# Patient Record
Sex: FEMALE | Race: WHITE | NOT HISPANIC OR LATINO | Employment: OTHER | URBAN - METROPOLITAN AREA
[De-identification: names, ages, dates, MRNs, and addresses within clinical notes are randomized per-mention and may not be internally consistent; named-entity substitution may affect disease eponyms.]

---

## 2023-10-19 ENCOUNTER — TELEPHONE (OUTPATIENT)
Dept: PRIMARY CARE | Facility: CLINIC | Age: 43
End: 2023-10-19
Payer: MEDICARE

## 2023-10-19 DIAGNOSIS — M19.90 ARTHRITIS: Primary | ICD-10-CM

## 2023-10-19 NOTE — TELEPHONE ENCOUNTER
Ernestina called and stated that it is time to renew Laura gilliland.  She wanted to know if we could mail it to her house please

## 2023-11-11 PROBLEM — J33.9 NASAL POLYP: Status: ACTIVE | Noted: 2023-11-11

## 2023-11-11 PROBLEM — D22.39 MELANOCYTIC NEVI OF OTHER PARTS OF FACE: Status: ACTIVE | Noted: 2023-06-15

## 2023-11-11 PROBLEM — F80.9 MENTAL AND BEHAVIORAL PROBLEMS WITH COMMUNICATION (INCLUDING SPEECH): Status: ACTIVE | Noted: 2023-11-11

## 2023-11-11 PROBLEM — I73.00 RAYNAUD PHENOMENON: Status: ACTIVE | Noted: 2023-11-11

## 2023-11-11 PROBLEM — L21.9 SEBORRHEIC DERMATITIS, UNSPECIFIED: Status: ACTIVE | Noted: 2023-06-15

## 2023-11-11 PROBLEM — J32.9 CHRONIC SINUSITIS, UNSPECIFIED: Status: ACTIVE | Noted: 2023-11-11

## 2023-11-11 PROBLEM — F81.9 LEARNING DISABILITIES: Status: ACTIVE | Noted: 2023-11-11

## 2023-11-11 PROBLEM — F42.9 OBSESSIVE-COMPULSIVE DISORDER: Status: ACTIVE | Noted: 2023-11-11

## 2023-11-11 PROBLEM — Z79.899 HIGH RISK MEDICATION USE: Status: ACTIVE | Noted: 2023-11-11

## 2023-11-11 PROBLEM — L71.9 ROSACEA: Status: ACTIVE | Noted: 2023-06-15

## 2023-11-11 PROBLEM — R60.0 PERIPHERAL EDEMA: Status: ACTIVE | Noted: 2023-11-11

## 2023-11-11 PROBLEM — D22.4 MELANOCYTIC NEVI OF SCALP AND NECK: Status: ACTIVE | Noted: 2023-06-15

## 2023-11-11 PROBLEM — F41.1 GENERALIZED ANXIETY DISORDER: Status: ACTIVE | Noted: 2023-11-11

## 2023-11-11 PROBLEM — H10.13 ALLERGIC CONJUNCTIVITIS OF BOTH EYES: Status: ACTIVE | Noted: 2023-11-11

## 2023-11-11 PROBLEM — D22.70 MELANOCYTIC NEVI OF UNSPECIFIED LOWER LIMB, INCLUDING HIP: Status: ACTIVE | Noted: 2023-06-15

## 2023-11-11 PROBLEM — L57.9 SKIN CHANGES DUE TO CHRONIC EXPOSURE TO NONIONIZING RADIATION, UNSPECIFIED: Status: ACTIVE | Noted: 2023-06-15

## 2023-11-11 PROBLEM — F40.02 AGORAPHOBIA WITHOUT PANIC DISORDER: Status: ACTIVE | Noted: 2023-11-11

## 2023-11-11 PROBLEM — K21.00 REFLUX ESOPHAGITIS: Status: ACTIVE | Noted: 2023-11-11

## 2023-11-11 PROBLEM — F41.9 ANXIETY: Status: ACTIVE | Noted: 2023-11-11

## 2023-11-11 PROBLEM — B37.9 YEAST INFECTION: Status: ACTIVE | Noted: 2023-11-11

## 2023-11-11 PROBLEM — D22.5 MELANOCYTIC NEVI OF TRUNK: Status: ACTIVE | Noted: 2023-06-15

## 2023-11-11 PROBLEM — Z30.9 CONTRACEPTIVE MANAGEMENT: Status: ACTIVE | Noted: 2023-11-11

## 2023-11-11 PROBLEM — F71 MODERATE INTELLECTUAL DISABILITIES: Status: ACTIVE | Noted: 2023-11-11

## 2023-11-11 PROBLEM — D22.60 MELANOCYTIC NEVI OF UNSPECIFIED UPPER LIMB, INCLUDING SHOULDER: Status: ACTIVE | Noted: 2023-06-15

## 2023-11-11 PROBLEM — E03.9 ADULT HYPOTHYROIDISM: Status: ACTIVE | Noted: 2023-11-11

## 2023-11-11 PROBLEM — R60.9 PERIPHERAL EDEMA: Status: ACTIVE | Noted: 2023-11-11

## 2023-11-11 RX ORDER — MUPIROCIN 20 MG/G
OINTMENT TOPICAL
COMMUNITY
Start: 2020-05-20

## 2023-11-11 RX ORDER — FLUVOXAMINE MALEATE 100 MG/1
100 TABLET, COATED ORAL 2 TIMES DAILY
COMMUNITY
End: 2023-11-13 | Stop reason: SDUPTHER

## 2023-11-11 RX ORDER — KETOCONAZOLE 20 MG/G
CREAM TOPICAL
COMMUNITY
Start: 2023-06-15

## 2023-11-11 RX ORDER — HYDROXYZINE HYDROCHLORIDE 10 MG/1
10 TABLET, FILM COATED ORAL 2 TIMES DAILY PRN
COMMUNITY

## 2023-11-11 RX ORDER — HYDROCHLOROTHIAZIDE 12.5 MG/1
1 CAPSULE ORAL DAILY
COMMUNITY
Start: 2019-07-16

## 2023-11-11 RX ORDER — ACETAMINOPHEN 500 MG
TABLET ORAL
COMMUNITY

## 2023-11-11 RX ORDER — KETOTIFEN FUMARATE 0.35 MG/ML
1 SOLUTION/ DROPS OPHTHALMIC
COMMUNITY
Start: 2019-09-13 | End: 2024-05-01 | Stop reason: ALTCHOICE

## 2023-11-11 RX ORDER — NYSTATIN 100000 [USP'U]/G
POWDER TOPICAL NIGHTLY PRN
COMMUNITY
End: 2024-05-01 | Stop reason: SDUPTHER

## 2023-11-11 RX ORDER — OMEPRAZOLE 20 MG/1
20 CAPSULE, DELAYED RELEASE ORAL DAILY
COMMUNITY
End: 2024-01-16

## 2023-11-11 RX ORDER — LEVOTHYROXINE SODIUM 75 UG/1
75 TABLET ORAL DAILY
COMMUNITY
End: 2024-02-06 | Stop reason: SDUPTHER

## 2023-11-11 RX ORDER — FLUVOXAMINE MALEATE 50 MG/1
50 TABLET, FILM COATED ORAL DAILY
COMMUNITY
Start: 2023-06-22 | End: 2023-11-12 | Stop reason: WASHOUT

## 2023-11-11 RX ORDER — KETOCONAZOLE 20 MG/ML
SHAMPOO, SUSPENSION TOPICAL
COMMUNITY

## 2023-11-11 RX ORDER — ACETAMINOPHEN, DIPHENHYDRAMINE HCL, PHENYLEPHRINE HCL 325; 25; 5 MG/1; MG/1; MG/1
10 TABLET ORAL
COMMUNITY
Start: 2018-05-03

## 2023-11-11 RX ORDER — ALPRAZOLAM 0.5 MG/1
0.5 TABLET ORAL 3 TIMES DAILY
COMMUNITY
End: 2023-11-13 | Stop reason: SDUPTHER

## 2023-11-11 RX ORDER — MEDROXYPROGESTERONE ACETATE 150 MG/ML
150 INJECTION, SUSPENSION INTRAMUSCULAR
COMMUNITY
End: 2024-04-12

## 2023-11-12 NOTE — PROGRESS NOTES
Outpatient Psychiatry    A HIPAA-compliant interactive audio and video telecommunication system which permits real time communications between the patient (at the originating site) and provider (at the distant site) was utilized to provide this telehealth service.     The patient, family, caregivers and guardian (as appropriate) have provided consent on this date to conduct treatment via this telehealth service.  The patient's identity and physical location were verified at the time of this visit.      Present for appointment: Sandra and mom/guardian (Ernestina).    SUBJECTIVE    No health problems since last check-in.  O/C symptoms/behaviors are about the same.  Her day tends to revolve around performing her compulsive adjusting/checking/arranging rituals.  She continues to have some repetitive skin picking and hair pulling, which is mostly unchanged.  She is a bit more anxious and on-edge today as the grand-kids are visiting (mom gave Sandra 10mg of hydroxyzine this morning).  Still up a lot at night engaging in rituals.    Review of Systems   Constitutional:  Negative for activity change.   HENT:  Negative for drooling and trouble swallowing.    Respiratory:  Negative for cough and choking.    Cardiovascular:  Negative for chest pain.   Gastrointestinal:  Negative for abdominal pain and constipation.   Genitourinary:  Negative for dysuria and enuresis.   Musculoskeletal:  Negative for arthralgias, gait problem and myalgias.   Neurological:  Negative for dizziness, tremors, seizures, syncope and light-headedness.   Psychiatric/Behavioral:  Positive for sleep disturbance. Negative for behavioral problems and self-injury.       Controlled Substance Evaluation  OARRS/PDMP reviewed: Davon Robertson MD on 11/13/2023  7:28 AM  Is the patient prescribed a combination of a benzodiazepine and opioid? No  I have personally reviewed the OARRS report for Sandra BRENNON Rousseau.   I have considered the risks of abuse, dependence,  addiction and diversion.    I believe that it is clinically appropriate for Sandra Rousseau to be prescribed this medication.    Last Urine Drug Screen:  Recent Results (from the past 8760 hour(s))   OPIATE/OPIOID/BENZO PRESCRIPTION COMPLIANCE    Collection Time: 11/21/22  7:32 AM   Result Value Ref Range    DRUG SCREEN COMMENT URINE SEE BELOW     Creatine, Urine 108.2 mg/dL    Amphetamine Screen, Urine PRESUMPTIVE NEGATIVE NEGATIVE    Barbiturate Screen, Urine PRESUMPTIVE NEGATIVE NEGATIVE    Cannabinoid Screen, Urine PRESUMPTIVE NEGATIVE NEGATIVE    Cocaine Screen, Urine PRESUMPTIVE NEGATIVE NEGATIVE    PCP Screen, Urine PRESUMPTIVE NEGATIVE NEGATIVE    7-Aminoclonazepam <25 Cutoff <25 ng/mL    Alpha-Hydroxyalprazolam 250 (A) Cutoff <25 ng/mL    Alpha-Hydroxymidazolam <25 Cutoff <25 ng/mL    Alprazolam 427 (A) Cutoff <25 ng/mL    Chlordiazepoxide <25 Cutoff <25 ng/mL    Clonazepam <25 Cutoff <25 ng/mL    Diazepam <25 Cutoff <25 ng/mL    Lorazepam <25 Cutoff <25 ng/mL    Midazolam <25 Cutoff <25 ng/mL    Nordiazepam <25 Cutoff <25 ng/mL    Oxazepam <25 Cutoff <25 ng/mL    Temazepam <25 Cutoff <25 ng/mL    Zolpidem <25 Cutoff <25 ng/mL    Zolpidem Metabolite (ZCA) <25 Cutoff <25 ng/mL    6-Acetylmorphine <25 Cutoff <25 ng/mL    Codeine <50 Cutoff <50 ng/mL    Hydrocodone <25 Cutoff <25 ng/mL    Hydromorphone <25 Cutoff <25 ng/mL    Morphine Urine <50 Cutoff <50 ng/mL    Norhydrocodone <25 Cutoff <25 ng/mL    Noroxycodone <25 Cutoff <25 ng/mL    Oxycodone <25 Cutoff <25 ng/mL    Oxymorphone <25 Cutoff <25 ng/mL    Tramadol <50 Cutoff <50 ng/mL    O-Desmethyltramadol <50 Cutoff <50 ng/mL    Fentanyl <2.5 Cutoff<2.5 ng/mL    Norfentanyl <2.5 Cutoff<2.5 ng/mL    METHADONE CONFIRMATION,URINE <25 Cutoff <25 ng/mL    EDDP <25 Cutoff <25 ng/mL     Results as expected? Yes    Controlled Substance Agreement: 11/21/2022  Reviewed Controlled Substance Agreement, including but not limited to:  Benefits, risks, and alternatives  to treatment with a controlled substance medication(s).    Prescribed Controlled Substances:  Benzodiazepines: alprazolam  What is the patient's goal of therapy? Reduced anxiety/agitation  Is this being achieved with current treatment? Yes  Activities of Daily Living:   Is your overall impression that this patient is benefiting (symptom reduction outweighs side effects) from benzodiazepine therapy? Yes   1. Physical Functioning: Same  2. Family Relationship: Same  3. Social Relationship: Same  4. Mood: Same  5. Sleep Patterns: Same  6. Overall Function: Same     MEDICATION HISTORY  Aripiprazole - 40 pound weight gain  Buspirone - worse anxiety and OCD  Clomipramine - not helpful  Clonazepam - no better than alprazolam  Depakote - no benefit  Diazepam - no improvement  Duloxetine - no benefit  Fluoxetine - no obvious benefit  Naltrexone - decreased appetite but no change in OCD  Paroxetine - agitated and bizarre behaviors  Propranolol - Raynaud's flares  Risperidone - EPS  Sertraline - not helpful  Topiramate - did not tolerate, likely paresthesias    CURRENT MEDICATIONS    Current Outpatient Medications:     ALPRAZolam (Xanax) 0.5 mg tablet, Take 1 tablet (0.5 mg) by mouth 3 times a day., Disp: , Rfl:     cholecalciferol (Vitamin D-3) 5,000 Units tablet, Take by mouth., Disp: , Rfl:     fLuvoxaMINE (Luvox) 100 mg tablet, Take 1 tablet (100 mg) by mouth 2 times a day., Disp: , Rfl:     hydroCHLOROthiazide (Microzide) 12.5 mg capsule, Take 1 capsule (12.5 mg) by mouth once daily., Disp: , Rfl:     hydrOXYzine HCL (Atarax) 10 mg tablet, Take 1 tablet (10 mg) by mouth 2 times a day as needed for anxiety., Disp: , Rfl:     ketoconazole (NIZOral) 2 % cream, Apply topically., Disp: , Rfl:     ketoconazole (NIZOral) 2 % shampoo, Apply topically., Disp: , Rfl:     ketotifen (Zaditor) 0.025 % (0.035 %) ophthalmic solution, Administer 1 drop into affected eye(s) once daily., Disp: , Rfl:     levothyroxine (Synthroid, Levoxyl)  75 mcg tablet, Take 1 tablet (75 mcg) by mouth once daily. as directed, Disp: , Rfl:     medroxyPROGESTERone 150 mg/mL injection syringe, Inject 1 mL (150 mg) into the muscle every 3 months., Disp: , Rfl:     melatonin 10 mg tablet, Take 1 tablet (10 mg) by mouth., Disp: , Rfl:     mupirocin (Bactroban) 2 % ointment, Apply topically., Disp: , Rfl:     nystatin (Mycostatin) 100,000 unit/gram powder, Apply topically as needed at bedtime., Disp: , Rfl:     omeprazole (PriLOSEC) 20 mg DR capsule, Take 1 capsule (20 mg) by mouth once daily., Disp: , Rfl:     PROGESTERONE INJECTION IN ETHYL OLEATE 50 MG/ML, , Disp: , Rfl:     SOCIAL HISTORY  Living situation Lives with parents   Provider agency None   Work or day program None   School N/A   Guardianship Mother (Ernestina)   SSA ?   Bx Specialist ?   Nicotine None   Alcohol None   Other drugs None     OBJECTIVE    Lab Results   Component Value Date    HGB 12.7 01/18/2023     01/18/2023    NEUTROABS 6.88 12/16/2021    GLUCOSE 91 01/18/2023     01/18/2023    K 3.9 01/18/2023    CO2 26 01/18/2023    CALCIUM 9.8 01/18/2023    CREATININE 0.94 01/18/2023    AST 13 01/18/2023    ALT 14 01/18/2023    HGBA1C 5.3 01/18/2023    TSH 1.53 01/18/2023    FREET4 1.48 01/18/2023    CHOL 182 01/18/2023    LDLF 116 (H) 01/18/2023    TRIG 78 01/18/2023    VITD25 63 12/30/2019     Therapeutic Drug Monitoring  N/A    Electrocardiograms  None in chart    MENTAL STATUS EXAM  General/Appearance: Appropriate dress/hygiene/grooming.  Attitude/Behavior: Aloof. Difficult to engage.  Speech/Communication: Apraxic.  Motor: Fidgets. Restless.  Gait: Not assessed at this visit.  Mood: Distressed.  Affect: Anxious.  Thought processes: Goal-directed.  Thought content: Unable to assess.  Perception: Does not appear to be experiencing or responding to hallucinations.  Sensorium/Cognition: Oriented to self and surroundings. Intellectual impairment. Minimal interest in participating.  Memory: Not  directly assessed at this time.  Insight: Absent.  Judgment: Redirectable.    ASSESSMENT  Sandra's anxiety and O/C rituals are mostly unchanged.  Her current treatment regimen seems to be the most optimal for symptom management and she has not had better responses to trials of alternative treatment options.  Will send annual CSA to parent/guardian and order for yearly UDS is active.    PROBLEM LIST  Intellectual developmental disorder, moderate  OCD  TORY  Agoraphobia    PLAN  -- Continue fluvoxamine 100mg BID for anxiety and OCD  -- Continue alprazolam 0.5mg BID for anxiety  -- Continue melatonin 10mg QHS for sleep  -- OTC CBD capsules 15mg once daily for anxiety  -- PRN: hydroxyzine 10mg to 20mg daily for anxiety  -- PRN: alprazolam 0.5mg once daily for severe anxiety  -- Follow up 3 months    Davon Robertson MD    Prep time on date of the patient encounter: 5 minutes   Time spent directly with patient/family/caregiver: 25 minutes   Additional time spent on patient care activities: 0 minutes   Documentation time: 5 minutes   Other time spent: 0 minutes   Total time on date of patient encounter: 35 minutes

## 2023-11-13 ENCOUNTER — TELEMEDICINE (OUTPATIENT)
Dept: BEHAVIORAL HEALTH | Facility: CLINIC | Age: 43
End: 2023-11-13
Payer: MEDICARE

## 2023-11-13 DIAGNOSIS — Z79.899 HIGH RISK MEDICATION USE: ICD-10-CM

## 2023-11-13 DIAGNOSIS — F42.2 MIXED OBSESSIONAL THOUGHTS AND ACTS: Primary | ICD-10-CM

## 2023-11-13 DIAGNOSIS — F41.1 GENERALIZED ANXIETY DISORDER: ICD-10-CM

## 2023-11-13 DIAGNOSIS — F71 MODERATE INTELLECTUAL DISABILITIES: ICD-10-CM

## 2023-11-13 DIAGNOSIS — F40.02 AGORAPHOBIA WITHOUT PANIC DISORDER: ICD-10-CM

## 2023-11-13 PROCEDURE — 99214 OFFICE O/P EST MOD 30 MIN: CPT | Performed by: PSYCHIATRY & NEUROLOGY

## 2023-11-13 RX ORDER — ALPRAZOLAM 0.5 MG/1
TABLET ORAL
Qty: 75 TABLET | Refills: 2 | Status: SHIPPED | OUTPATIENT
Start: 2023-11-13 | End: 2024-02-12 | Stop reason: SDUPTHER

## 2023-11-13 RX ORDER — FLUVOXAMINE MALEATE 100 MG/1
100 TABLET, COATED ORAL 2 TIMES DAILY
Qty: 180 TABLET | Refills: 0 | Status: SHIPPED | OUTPATIENT
Start: 2023-11-13 | End: 2024-02-12 | Stop reason: SDUPTHER

## 2023-11-13 ASSESSMENT — ENCOUNTER SYMPTOMS
TROUBLE SWALLOWING: 0
CONSTIPATION: 0
SLEEP DISTURBANCE: 1
SEIZURES: 0
DYSURIA: 0
DIZZINESS: 0
MYALGIAS: 0
ACTIVITY CHANGE: 0
ARTHRALGIAS: 0
ABDOMINAL PAIN: 0
LIGHT-HEADEDNESS: 0
COUGH: 0
TREMORS: 0
CHOKING: 0

## 2024-01-03 ENCOUNTER — TELEPHONE (OUTPATIENT)
Dept: PRIMARY CARE | Facility: CLINIC | Age: 44
End: 2024-01-03
Payer: MEDICARE

## 2024-01-03 DIAGNOSIS — E03.9 ADULT HYPOTHYROIDISM: Primary | ICD-10-CM

## 2024-01-03 DIAGNOSIS — F41.1 GENERALIZED ANXIETY DISORDER: ICD-10-CM

## 2024-01-03 DIAGNOSIS — R60.9 PERIPHERAL EDEMA: ICD-10-CM

## 2024-01-03 DIAGNOSIS — I73.00 RAYNAUD'S PHENOMENON WITHOUT GANGRENE: ICD-10-CM

## 2024-01-03 DIAGNOSIS — R73.9 HYPERGLYCEMIA: ICD-10-CM

## 2024-01-05 ENCOUNTER — LAB (OUTPATIENT)
Dept: LAB | Facility: LAB | Age: 44
End: 2024-01-05
Payer: MEDICARE

## 2024-01-05 DIAGNOSIS — R60.9 PERIPHERAL EDEMA: ICD-10-CM

## 2024-01-05 DIAGNOSIS — Z79.899 HIGH RISK MEDICATION USE: ICD-10-CM

## 2024-01-05 DIAGNOSIS — R73.9 HYPERGLYCEMIA: ICD-10-CM

## 2024-01-05 DIAGNOSIS — F41.1 GENERALIZED ANXIETY DISORDER: ICD-10-CM

## 2024-01-05 DIAGNOSIS — E03.9 ADULT HYPOTHYROIDISM: ICD-10-CM

## 2024-01-05 DIAGNOSIS — I73.00 RAYNAUD'S PHENOMENON WITHOUT GANGRENE: ICD-10-CM

## 2024-01-05 LAB
ALBUMIN SERPL BCP-MCNC: 4.1 G/DL (ref 3.4–5)
ALP SERPL-CCNC: 58 U/L (ref 33–110)
ALT SERPL W P-5'-P-CCNC: 14 U/L (ref 7–45)
AMPHETAMINES UR QL SCN: NORMAL
ANION GAP SERPL CALC-SCNC: 15 MMOL/L (ref 10–20)
AST SERPL W P-5'-P-CCNC: 14 U/L (ref 9–39)
BARBITURATES UR QL SCN: NORMAL
BASOPHILS # BLD AUTO: 0.01 X10*3/UL (ref 0–0.1)
BASOPHILS NFR BLD AUTO: 0.1 %
BILIRUB SERPL-MCNC: 0.4 MG/DL (ref 0–1.2)
BUN SERPL-MCNC: 21 MG/DL (ref 6–23)
BZE UR QL SCN: NORMAL
CALCIUM SERPL-MCNC: 9.6 MG/DL (ref 8.6–10.6)
CANNABINOIDS UR QL SCN: NORMAL
CHLORIDE SERPL-SCNC: 99 MMOL/L (ref 98–107)
CHOLEST SERPL-MCNC: 184 MG/DL (ref 0–199)
CHOLESTEROL/HDL RATIO: 3.7
CO2 SERPL-SCNC: 28 MMOL/L (ref 21–32)
CREAT SERPL-MCNC: 0.94 MG/DL (ref 0.5–1.05)
CREAT UR-MCNC: 27.7 MG/DL (ref 20–320)
EOSINOPHIL # BLD AUTO: 0.16 X10*3/UL (ref 0–0.7)
EOSINOPHIL NFR BLD AUTO: 2 %
ERYTHROCYTE [DISTWIDTH] IN BLOOD BY AUTOMATED COUNT: 16.6 % (ref 11.5–14.5)
EST. AVERAGE GLUCOSE BLD GHB EST-MCNC: 105 MG/DL
GFR SERPL CREATININE-BSD FRML MDRD: 77 ML/MIN/1.73M*2
GLUCOSE SERPL-MCNC: 80 MG/DL (ref 74–99)
HBA1C MFR BLD: 5.3 %
HCT VFR BLD AUTO: 40.6 % (ref 36–46)
HDLC SERPL-MCNC: 49.6 MG/DL
HGB BLD-MCNC: 12.3 G/DL (ref 12–16)
IMM GRANULOCYTES # BLD AUTO: 0.03 X10*3/UL (ref 0–0.7)
IMM GRANULOCYTES NFR BLD AUTO: 0.4 % (ref 0–0.9)
LDLC SERPL CALC-MCNC: 122 MG/DL
LYMPHOCYTES # BLD AUTO: 1.85 X10*3/UL (ref 1.2–4.8)
LYMPHOCYTES NFR BLD AUTO: 22.9 %
MCH RBC QN AUTO: 23.3 PG (ref 26–34)
MCHC RBC AUTO-ENTMCNC: 30.3 G/DL (ref 32–36)
MCV RBC AUTO: 77 FL (ref 80–100)
MONOCYTES # BLD AUTO: 0.49 X10*3/UL (ref 0.1–1)
MONOCYTES NFR BLD AUTO: 6.1 %
NEUTROPHILS # BLD AUTO: 5.54 X10*3/UL (ref 1.2–7.7)
NEUTROPHILS NFR BLD AUTO: 68.5 %
NON HDL CHOLESTEROL: 134 MG/DL (ref 0–149)
NRBC BLD-RTO: 0 /100 WBCS (ref 0–0)
PCP UR QL SCN: NORMAL
PLATELET # BLD AUTO: 229 X10*3/UL (ref 150–450)
POTASSIUM SERPL-SCNC: 4.1 MMOL/L (ref 3.5–5.3)
PROT SERPL-MCNC: 7 G/DL (ref 6.4–8.2)
RBC # BLD AUTO: 5.28 X10*6/UL (ref 4–5.2)
SODIUM SERPL-SCNC: 138 MMOL/L (ref 136–145)
T4 FREE SERPL-MCNC: 1.26 NG/DL (ref 0.78–1.48)
TRIGL SERPL-MCNC: 64 MG/DL (ref 0–149)
TSH SERPL-ACNC: 3.22 MIU/L (ref 0.44–3.98)
VLDL: 13 MG/DL (ref 0–40)
WBC # BLD AUTO: 8.1 X10*3/UL (ref 4.4–11.3)

## 2024-01-05 PROCEDURE — 84443 ASSAY THYROID STIM HORMONE: CPT

## 2024-01-05 PROCEDURE — 80346 BENZODIAZEPINES1-12: CPT

## 2024-01-05 PROCEDURE — 85025 COMPLETE CBC W/AUTO DIFF WBC: CPT

## 2024-01-05 PROCEDURE — 80361 OPIATES 1 OR MORE: CPT

## 2024-01-05 PROCEDURE — 80354 DRUG SCREENING FENTANYL: CPT

## 2024-01-05 PROCEDURE — 83036 HEMOGLOBIN GLYCOSYLATED A1C: CPT

## 2024-01-05 PROCEDURE — 80061 LIPID PANEL: CPT

## 2024-01-05 PROCEDURE — 80053 COMPREHEN METABOLIC PANEL: CPT

## 2024-01-05 PROCEDURE — 80373 DRUG SCREENING TRAMADOL: CPT

## 2024-01-05 PROCEDURE — 80358 DRUG SCREENING METHADONE: CPT

## 2024-01-05 PROCEDURE — 80368 SEDATIVE HYPNOTICS: CPT

## 2024-01-05 PROCEDURE — 84439 ASSAY OF FREE THYROXINE: CPT

## 2024-01-05 PROCEDURE — 36415 COLL VENOUS BLD VENIPUNCTURE: CPT

## 2024-01-05 PROCEDURE — 80307 DRUG TEST PRSMV CHEM ANLYZR: CPT

## 2024-01-05 PROCEDURE — 82570 ASSAY OF URINE CREATININE: CPT

## 2024-01-05 PROCEDURE — 80365 DRUG SCREENING OXYCODONE: CPT

## 2024-01-11 ENCOUNTER — TELEPHONE (OUTPATIENT)
Dept: PRIMARY CARE | Facility: CLINIC | Age: 44
End: 2024-01-11
Payer: MEDICARE

## 2024-01-11 DIAGNOSIS — U07.1 COVID-19: Primary | ICD-10-CM

## 2024-01-11 LAB
1OH-MIDAZOLAM UR CFM-MCNC: <25 NG/ML
6MAM UR CFM-MCNC: <25 NG/ML
7AMINOCLONAZEPAM UR CFM-MCNC: <25 NG/ML
A-OH ALPRAZ UR CFM-MCNC: 45 NG/ML
ALPRAZ UR CFM-MCNC: 129 NG/ML
CHLORDIAZEP UR CFM-MCNC: <25 NG/ML
CLONAZEPAM UR CFM-MCNC: <25 NG/ML
CODEINE UR CFM-MCNC: <50 NG/ML
DIAZEPAM UR CFM-MCNC: <25 NG/ML
EDDP UR CFM-MCNC: <25 NG/ML
FENTANYL UR CFM-MCNC: <2.5 NG/ML
HYDROCODONE CTO UR CFM-MCNC: <25 NG/ML
HYDROMORPHONE UR CFM-MCNC: <25 NG/ML
LORAZEPAM UR CFM-MCNC: <25 NG/ML
METHADONE UR CFM-MCNC: <25 NG/ML
MIDAZOLAM UR CFM-MCNC: <25 NG/ML
MORPHINE UR CFM-MCNC: <50 NG/ML
NORDIAZEPAM UR CFM-MCNC: <25 NG/ML
NORFENTANYL UR CFM-MCNC: <2.5 NG/ML
NORHYDROCODONE UR CFM-MCNC: <25 NG/ML
NOROXYCODONE UR CFM-MCNC: <25 NG/ML
NORTRAMADOL UR-MCNC: <50 NG/ML
OXAZEPAM UR CFM-MCNC: <25 NG/ML
OXYCODONE UR CFM-MCNC: <25 NG/ML
OXYMORPHONE UR CFM-MCNC: <25 NG/ML
TEMAZEPAM UR CFM-MCNC: <25 NG/ML
TRAMADOL UR CFM-MCNC: <50 NG/ML
ZOLPIDEM UR CFM-MCNC: <25 NG/ML
ZOLPIDEM UR-MCNC: <25 NG/ML

## 2024-01-11 NOTE — TELEPHONE ENCOUNTER
Mom called and iván has had a low grade fever for a day and a half and nasal drainage. She did test positive for covid today. Can paxlovid be sent to the pharm please?

## 2024-02-06 ENCOUNTER — OFFICE VISIT (OUTPATIENT)
Dept: PRIMARY CARE | Facility: CLINIC | Age: 44
End: 2024-02-06
Payer: MEDICARE

## 2024-02-06 VITALS
BODY MASS INDEX: 37.99 KG/M2 | WEIGHT: 181 LBS | DIASTOLIC BLOOD PRESSURE: 76 MMHG | HEIGHT: 58 IN | TEMPERATURE: 97.7 F | RESPIRATION RATE: 18 BRPM | SYSTOLIC BLOOD PRESSURE: 122 MMHG | HEART RATE: 76 BPM

## 2024-02-06 DIAGNOSIS — F41.1 GENERALIZED ANXIETY DISORDER: ICD-10-CM

## 2024-02-06 DIAGNOSIS — E03.9 ADULT HYPOTHYROIDISM: Primary | ICD-10-CM

## 2024-02-06 DIAGNOSIS — Z00.00 WELL ADULT EXAM: ICD-10-CM

## 2024-02-06 DIAGNOSIS — F42.2 MIXED OBSESSIONAL THOUGHTS AND ACTS: ICD-10-CM

## 2024-02-06 DIAGNOSIS — F71 MODERATE INTELLECTUAL DISABILITIES: ICD-10-CM

## 2024-02-06 DIAGNOSIS — Z00.00 MEDICARE ANNUAL WELLNESS VISIT, SUBSEQUENT: ICD-10-CM

## 2024-02-06 PROCEDURE — G0439 PPPS, SUBSEQ VISIT: HCPCS | Performed by: FAMILY MEDICINE

## 2024-02-06 PROCEDURE — 99213 OFFICE O/P EST LOW 20 MIN: CPT | Performed by: FAMILY MEDICINE

## 2024-02-06 RX ORDER — LEVOTHYROXINE SODIUM 75 UG/1
75 TABLET ORAL DAILY
Qty: 90 TABLET | Refills: 3 | Status: SHIPPED | OUTPATIENT
Start: 2024-02-06 | End: 2024-02-21

## 2024-02-06 ASSESSMENT — PATIENT HEALTH QUESTIONNAIRE - PHQ9
2. FEELING DOWN, DEPRESSED OR HOPELESS: NOT AT ALL
1. LITTLE INTEREST OR PLEASURE IN DOING THINGS: NOT AT ALL
SUM OF ALL RESPONSES TO PHQ9 QUESTIONS 1 AND 2: 0

## 2024-02-06 ASSESSMENT — ACTIVITIES OF DAILY LIVING (ADL)
DOING_HOUSEWORK: INDEPENDENT
GROCERY_SHOPPING: NEEDS ASSISTANCE
TAKING_MEDICATION: NEEDS ASSISTANCE
MANAGING_FINANCES: TOTAL CARE
BATHING: NEEDS ASSISTANCE
DRESSING: INDEPENDENT

## 2024-02-06 NOTE — PROGRESS NOTES
"Subjective   Reason for Visit: Sandra Rousseau is an 43 y.o. female here for a Medicare Wellness visit.     Past Medical, Surgical, and Family History reviewed and updated in chart.         HPI    Patient Care Team:  Davon Carvajal DO as PCP - General (Family Medicine)  Davon Carvajal DO as PCP - [employer]vantage PCP     Review of Systems   Constitutional: Negative.         Pt's mother is her historian due to Sandra being non verbal   HENT: Negative.     Eyes: Negative.    Respiratory: Negative.     Cardiovascular: Negative.    Gastrointestinal: Negative.    Endocrine: Negative.    Genitourinary: Negative.    Musculoskeletal: Negative.    Skin: Negative.    Allergic/Immunologic: Negative.    Neurological: Negative.    Hematological: Negative.    Psychiatric/Behavioral:  The patient is nervous/anxious.         PT suffers with severe OCD.        Objective   Vitals:  /76   Pulse 76   Temp 36.5 °C (97.7 °F)   Resp 18   Ht 1.473 m (4' 10\")   Wt 82.1 kg (181 lb)   BMI 37.83 kg/m²       Physical Exam  Vitals and nursing note reviewed.   Constitutional:       Appearance: Normal appearance. She is obese.   HENT:      Head: Normocephalic and atraumatic.      Nose: Nose normal.      Mouth/Throat:      Pharynx: Oropharynx is clear.   Eyes:      Extraocular Movements: Extraocular movements intact.      Conjunctiva/sclera: Conjunctivae normal.      Pupils: Pupils are equal, round, and reactive to light.   Neck:      Vascular: No carotid bruit.   Cardiovascular:      Rate and Rhythm: Normal rate and regular rhythm.      Pulses: Normal pulses.      Heart sounds: Normal heart sounds.   Pulmonary:      Effort: Pulmonary effort is normal. No respiratory distress.      Breath sounds: Normal breath sounds. No wheezing, rhonchi or rales.   Abdominal:      General: Abdomen is flat. Bowel sounds are normal. There is no distension.      Palpations: Abdomen is soft.      Tenderness: There is no abdominal tenderness.      " Hernia: No hernia is present.   Musculoskeletal:         General: No swelling or tenderness. Normal range of motion.      Cervical back: Normal range of motion and neck supple. No tenderness.   Lymphadenopathy:      Cervical: No cervical adenopathy.   Skin:     General: Skin is warm and dry.      Capillary Refill: Capillary refill takes less than 2 seconds.   Neurological:      General: No focal deficit present.      Mental Status: She is alert and oriented to person, place, and time.      Cranial Nerves: No cranial nerve deficit.   Psychiatric:         Attention and Perception: Attention and perception normal.         Mood and Affect: Mood normal.         Behavior: Behavior normal.         Thought Content: Thought content normal.         Judgment: Judgment normal.         Assessment/Plan   Problem List Items Addressed This Visit       Generalized anxiety disorder    Obsessive-compulsive disorder    Adult hypothyroidism - Primary    Relevant Medications    levothyroxine (Synthroid, Levoxyl) 75 mcg tablet    Other Relevant Orders    Follow Up In [facility] - PCP - Health Maintenance    Moderate intellectual disabilities     Other Visit Diagnoses       Medicare annual wellness visit, subsequent        Well adult exam

## 2024-02-10 ASSESSMENT — ENCOUNTER SYMPTOMS
EYES NEGATIVE: 1
GASTROINTESTINAL NEGATIVE: 1
ALLERGIC/IMMUNOLOGIC NEGATIVE: 1
CARDIOVASCULAR NEGATIVE: 1
ENDOCRINE NEGATIVE: 1
NERVOUS/ANXIOUS: 1
MUSCULOSKELETAL NEGATIVE: 1
RESPIRATORY NEGATIVE: 1
CONSTITUTIONAL NEGATIVE: 1
NEUROLOGICAL NEGATIVE: 1
HEMATOLOGIC/LYMPHATIC NEGATIVE: 1

## 2024-02-12 ENCOUNTER — TELEMEDICINE (OUTPATIENT)
Dept: BEHAVIORAL HEALTH | Facility: CLINIC | Age: 44
End: 2024-02-12
Payer: MEDICARE

## 2024-02-12 DIAGNOSIS — F71 MODERATE INTELLECTUAL DISABILITIES: ICD-10-CM

## 2024-02-12 DIAGNOSIS — F42.2 MIXED OBSESSIONAL THOUGHTS AND ACTS: Primary | ICD-10-CM

## 2024-02-12 DIAGNOSIS — F41.1 GENERALIZED ANXIETY DISORDER: ICD-10-CM

## 2024-02-12 DIAGNOSIS — F40.02 AGORAPHOBIA WITHOUT PANIC DISORDER: ICD-10-CM

## 2024-02-12 PROBLEM — B37.9 YEAST INFECTION: Status: RESOLVED | Noted: 2023-11-11 | Resolved: 2024-02-12

## 2024-02-12 PROBLEM — L57.9 SKIN CHANGES DUE TO CHRONIC EXPOSURE TO NONIONIZING RADIATION, UNSPECIFIED: Status: RESOLVED | Noted: 2023-06-15 | Resolved: 2024-02-12

## 2024-02-12 PROCEDURE — 1036F TOBACCO NON-USER: CPT | Performed by: PSYCHIATRY & NEUROLOGY

## 2024-02-12 PROCEDURE — 99214 OFFICE O/P EST MOD 30 MIN: CPT | Performed by: PSYCHIATRY & NEUROLOGY

## 2024-02-12 RX ORDER — ALPRAZOLAM 0.5 MG/1
TABLET ORAL
Qty: 75 TABLET | Refills: 2 | Status: SHIPPED | OUTPATIENT
Start: 2024-02-12 | End: 2024-05-13 | Stop reason: SDUPTHER

## 2024-02-12 RX ORDER — FLUVOXAMINE MALEATE 100 MG/1
100 TABLET, COATED ORAL 2 TIMES DAILY
Qty: 180 TABLET | Refills: 0 | Status: SHIPPED | OUTPATIENT
Start: 2024-02-12 | End: 2024-05-13 | Stop reason: SDUPTHER

## 2024-02-12 ASSESSMENT — ENCOUNTER SYMPTOMS
SLEEP DISTURBANCE: 1
MYALGIAS: 0
ABDOMINAL PAIN: 0
ACTIVITY CHANGE: 0
SEIZURES: 0
CHOKING: 0
TREMORS: 0
LIGHT-HEADEDNESS: 0
ARTHRALGIAS: 0
DYSURIA: 0
DIZZINESS: 0
TROUBLE SWALLOWING: 0
CONSTIPATION: 0
COUGH: 0

## 2024-02-12 NOTE — PROGRESS NOTES
Outpatient Psychiatry    A HIPAA-compliant interactive audio and video telecommunication system which permits real time communications between the patient (at the originating site) and provider (at the distant site) was utilized to provide this telehealth service.     The patient, family, caregivers and guardian (as appropriate) have provided consent on this date to conduct treatment via this telehealth service.  The patient's identity and physical location were verified at the time of this visit.      Present for appointment: Sandra and mom/guardian (Ernestina).    SUBJECTIVE    Last visit with Sandra was 11/13/23.  She and parents contracted COVID in early January; Sandra had fairly mild symptoms.  Had updated labs done and yearly visit with PCP -- no treatment changes made.  She will be transitioning to a new dentist in May.  Mood has been a little more irritable/grumpy, but mom attributes this to the chaos/commotion surrounding the holidays.  She really wants to go see Mihai Shaw in concert at [employer] in August.  O/C symptoms/behaviors are about the same.  Her day tends to revolve around performing her compulsive adjusting/checking/arranging rituals.  She continues to have some repetitive skin picking and hair pulling, which is mostly unchanged.  Still up a lot at night engaging in rituals; mom thinks Sandra's sleep has been a little worse than usual.    Review of Systems   Constitutional:  Negative for activity change.   HENT:  Negative for drooling and trouble swallowing.    Respiratory:  Negative for cough and choking.    Cardiovascular:  Negative for chest pain.   Gastrointestinal:  Negative for abdominal pain and constipation.   Genitourinary:  Negative for dysuria and enuresis.   Musculoskeletal:  Negative for arthralgias, gait problem and myalgias.   Neurological:  Negative for dizziness, tremors, seizures, syncope and light-headedness.   Psychiatric/Behavioral:  Positive for sleep disturbance. Negative for behavioral  problems and self-injury.       Controlled Substance Evaluation  OARRS/PDMP reviewed: Davon Robertson MD on 2/12/2024  6:35 AM  Is the patient prescribed a combination of a benzodiazepine and opioid? No  I have personally reviewed the OARRS report for Sandra Rousseau.   I have considered the risks of abuse, dependence, addiction and diversion.    I believe that it is clinically appropriate for Sandra Rousseau to be prescribed this medication.    Last Urine Drug Screen:  Recent Results (from the past 8760 hour(s))   Confirmation Opiate/Opioid/Benzo Prescription Compliance    Collection Time: 01/05/24 10:49 AM   Result Value Ref Range    Clonazepam <25 <25 ng/mL    7-Aminoclonazepam <25 <25 ng/mL    Alprazolam 129 (H) <25 ng/mL    Alpha-Hydroxyalprazolam 45 (H) <25 ng/mL    Midazolam <25 <25 ng/mL    Alpha-Hydroxymidazolam <25 <25 ng/mL    Chlordiazepoxide <25 <25 ng/mL    Diazepam <25 <25 ng/mL    Nordiazepam <25 <25 ng/mL    Temazepam <25 <25 ng/mL    Oxazepam <25 <25 ng/mL    Lorazepam <25 <25 ng/mL    Methadone <25 <25 ng/mL    EDDP <25 <25 ng/mL    6-Acetylmorphine <25 <25 ng/mL    Codeine <50 <50 ng/mL    Hydrocodone <25 <25 ng/mL    Hydromorphone <25 <25 ng/mL    Morphine  <50 <50 ng/mL    Norhydrocodone <25 <25 ng/mL    Noroxycodone <25 <25 ng/mL    Oxycodone <25 <25 ng/mL    Oxymorphone <25 <25 ng/mL    Fentanyl <2.5 <2.5 ng/mL    Norfentanyl <2.5 <2.5 ng/mL    Tramadol <50 <50 ng/mL    O-Desmethyltramadol <50 <50 ng/mL    Zolpidem <25 <25 ng/mL    Zolpidem Metabolite (ZCA) <25 <25 ng/mL   Screen Opiate/Opioid/Benzo Prescription Compliance    Collection Time: 01/05/24 10:49 AM   Result Value Ref Range    Creatinine, Urine Random 27.7 20.0 - 320.0 mg/dL    Amphetamine Screen, Urine Presumptive Negative Presumptive Negative    Barbiturate Screen, Urine Presumptive Negative Presumptive Negative    Cannabinoid Screen, Urine Presumptive Negative Presumptive Negative    Cocaine Metabolite Screen, Urine  Presumptive Negative Presumptive Negative    PCP Screen, Urine Presumptive Negative Presumptive Negative     Results as expected? Yes    Controlled Substance Agreement: 11/15/23  Reviewed Controlled Substance Agreement, including but not limited to:  Benefits, risks, and alternatives to treatment with a controlled substance medication(s).    Prescribed Controlled Substances:  Benzodiazepines: Alprazolam  What is the patient's goal of therapy? Reduced anxiety/agitation  Is this being achieved with current treatment? Yes  Activities of Daily Living:   Is your overall impression that this patient is benefiting (symptom reduction outweighs side effects) from benzodiazepine therapy? Yes   1. Physical Functioning: Same  2. Family Relationship: Same  3. Social Relationship: Same  4. Mood: Same  5. Sleep Patterns: Same  6. Overall Function: Same     MEDICATION HISTORY  Aripiprazole - 40 pound weight gain  Buspirone - worse anxiety and OCD  Clomipramine - not helpful  Clonazepam - no better than alprazolam  Depakote - no benefit  Diazepam - no improvement  Duloxetine - no benefit  Fluoxetine - no obvious benefit  Naltrexone - decreased appetite but no change in OCD  Paroxetine - agitated and bizarre behaviors  Propranolol - Raynaud's flares  Risperidone - EPS  Sertraline - not helpful  Topiramate - did not tolerate, likely paresthesias    CURRENT MEDICATIONS    Current Outpatient Medications:     ALPRAZolam (Xanax) 0.5 mg tablet, Take 1 tablet up to 3 times per day, Disp: 75 tablet, Rfl: 2    cholecalciferol (Vitamin D-3) 5,000 Units tablet, Take by mouth., Disp: , Rfl:     fLuvoxaMINE (Luvox) 100 mg tablet, Take 1 tablet (100 mg) by mouth 2 times a day., Disp: 180 tablet, Rfl: 0    hydroCHLOROthiazide (Microzide) 12.5 mg capsule, Take 1 capsule (12.5 mg) by mouth once daily., Disp: , Rfl:     hydrOXYzine HCL (Atarax) 10 mg tablet, Take 1 tablet (10 mg) by mouth 2 times a day as needed for anxiety., Disp: , Rfl:      ketoconazole (NIZOral) 2 % cream, Apply topically., Disp: , Rfl:     ketoconazole (NIZOral) 2 % shampoo, Apply topically., Disp: , Rfl:     ketotifen (Zaditor) 0.025 % (0.035 %) ophthalmic solution, Administer 1 drop into affected eye(s) once daily., Disp: , Rfl:     levothyroxine (Synthroid, Levoxyl) 75 mcg tablet, Take 1 tablet (75 mcg) by mouth once daily. as directed, Disp: 90 tablet, Rfl: 3    medroxyPROGESTERone 150 mg/mL injection syringe, Inject 1 mL (150 mg) into the muscle every 3 months., Disp: , Rfl:     melatonin 10 mg tablet, Take 1 tablet (10 mg) by mouth., Disp: , Rfl:     mupirocin (Bactroban) 2 % ointment, Apply topically., Disp: , Rfl:     nystatin (Mycostatin) 100,000 unit/gram powder, Apply topically as needed at bedtime., Disp: , Rfl:     omeprazole (PriLOSEC) 20 mg DR capsule, TAKE 1 CAPSULE BY MOUTH EVERY DAY, Disp: 90 capsule, Rfl: 3    PROGESTERONE INJECTION IN ETHYL OLEATE 50 MG/ML, , Disp: , Rfl:     SOCIAL HISTORY  Living situation Lives with parents   Provider agency None   Work or day program None   School N/A   Guardianship Mother (Ernestina)   SSA ?   Bx Specialist ?   Nicotine None   Alcohol None   Other drugs None     OBJECTIVE    Lab Results   Component Value Date    HGB 12.3 01/05/2024     01/05/2024    NEUTROABS 5.54 01/05/2024    GLUCOSE 80 01/05/2024     01/05/2024    K 4.1 01/05/2024    CO2 28 01/05/2024    CALCIUM 9.6 01/05/2024    CREATININE 0.94 01/05/2024    AST 14 01/05/2024    ALT 14 01/05/2024    HGBA1C 5.3 01/05/2024    TSH 3.22 01/05/2024    FREET4 1.26 01/05/2024    CHOL 184 01/05/2024    LDLF 116 (H) 01/18/2023    TRIG 64 01/05/2024    VITD25 63 12/30/2019     Therapeutic Drug Monitoring  N/A    Electrocardiograms  None in chart    MENTAL STATUS EXAM  General/Appearance: Appropriate dress/hygiene/grooming.  Attitude/Behavior: Minimally engaged. Non-specific eye contact.  Speech/Communication: Apraxic.  Motor: Fidgets. Restless.  Gait: Not assessed at this  visit.  Mood: Calm.  Affect: Anxious.  Thought processes: Goal-directed.  Thought content: Unable to assess.  Perception: Does not appear to be experiencing or responding to hallucinations.  Sensorium/Cognition: Oriented to self and surroundings. Intellectual impairment. Minimal interest in participating.  Memory: Not directly assessed at this time.  Insight: Absent.  Judgment: Redirectable.    ASSESSMENT  Sandra's anxiety and O/C rituals are mostly unchanged.  Her current treatment regimen seems to be the most optimal for symptom management and she has not had better responses to trials of alternative treatment options.    PROBLEM LIST  Intellectual developmental disorder, moderate  OCD  TORY  Agoraphobia    PLAN  -- Continue fluvoxamine 100mg BID for anxiety and OCD  -- Continue alprazolam 0.5mg BID for anxiety  -- Continue melatonin 10mg QHS for sleep  -- OTC CBD capsules 15mg once daily for anxiety  -- PRN: hydroxyzine 10mg to 20mg daily for anxiety  -- PRN: alprazolam 0.5mg once daily for severe anxiety  -- Follow up 3 months    Davon Robertson MD    Prep time on date of the patient encounter: 5 minutes   Time spent directly with patient/family/caregiver: 25 minutes   Additional time spent on patient care activities: 0 minutes   Documentation time: 5 minutes   Other time spent: 0 minutes   Total time on date of patient encounter: 35 minutes

## 2024-05-01 ENCOUNTER — OFFICE VISIT (OUTPATIENT)
Dept: OBSTETRICS AND GYNECOLOGY | Facility: CLINIC | Age: 44
End: 2024-05-01
Payer: MEDICARE

## 2024-05-01 VITALS
BODY MASS INDEX: 37.21 KG/M2 | HEIGHT: 58 IN | DIASTOLIC BLOOD PRESSURE: 84 MMHG | WEIGHT: 177.25 LBS | SYSTOLIC BLOOD PRESSURE: 114 MMHG

## 2024-05-01 DIAGNOSIS — Z00.00 WELL ADULT EXAM: ICD-10-CM

## 2024-05-01 DIAGNOSIS — R21 RASH: ICD-10-CM

## 2024-05-01 DIAGNOSIS — Z12.31 VISIT FOR SCREENING MAMMOGRAM: ICD-10-CM

## 2024-05-01 PROCEDURE — 99396 PREV VISIT EST AGE 40-64: CPT | Performed by: OBSTETRICS & GYNECOLOGY

## 2024-05-01 PROCEDURE — 1036F TOBACCO NON-USER: CPT | Performed by: OBSTETRICS & GYNECOLOGY

## 2024-05-01 RX ORDER — NYSTATIN 100000 [USP'U]/G
POWDER TOPICAL NIGHTLY PRN
Qty: 60 G | Refills: 2 | Status: SHIPPED | OUTPATIENT
Start: 2024-05-01

## 2024-05-01 RX ORDER — MEDROXYPROGESTERONE ACETATE 150 MG/ML
150 INJECTION, SUSPENSION INTRAMUSCULAR
Qty: 1 ML | Refills: 3 | Status: SHIPPED | OUTPATIENT
Start: 2024-05-01

## 2024-05-01 NOTE — PROGRESS NOTES
"Patient presents for an annual exam   Last PAP 2023 NEG HPV-  Last Mammogram 2023 NEG    GYN ANNUAL EXAM    SUBJECTIVE    Sandra Rousseau is a 43 y.o. female who presents for annual exam today.  Her periods are absent.  She is using DepoProvera to achieve amenorrhea.        PMH - agoraphobia, hypothyroidism, OCD     PSH - none     OB history -       Last pap -   Normal HPV Negative-  - normal, negative HPV     Last mammogram - 2023    Family history of breast or ovarian cancer - None     OBJECTIVE  /84 (BP Location: Right arm, Patient Position: Sitting, BP Cuff Size: Adult)   Ht 1.473 m (4' 10\")   Wt 80.4 kg (177 lb 4 oz)   Breastfeeding No   BMI 37.05 kg/m²     General Appearance   - consistent with stated age, well groomed and cooperative    Integumentary  - skin warm and dry without rash    Head and Neck  - normalocephalic and neck supple    Chest and Lung Exam  - normal breathing effort, no respiratory distress    Breast  - symmetry noted, no mass palpable, no skin change and no nipple discharge.    Abdomen  - soft, nontender and no hepatomegaly, splenomegaly, or mass    Female Genitourinary  - Declines pelvic exam unless needed for cervical cancer screening     Peripheral Vascular  - no edema present    ASSESSMENT/PLAN  43 y.o.  female who presents for annual exam.       Actions performed during this visit include:  - Clinical breast exam  - Pap- UTD and not indicated today   - Mammogram- Breast cancer screening discussed and screening mammogram ordered.   - Contraception - Not sexually active and has never been sexually active but uses DepoProvera for menstrual suppression     Please return for your next visit in 1 year or sooner as needed.     Mikayla Prakash MD  "

## 2024-05-03 ENCOUNTER — APPOINTMENT (OUTPATIENT)
Dept: OBSTETRICS AND GYNECOLOGY | Facility: CLINIC | Age: 44
End: 2024-05-03
Payer: MEDICARE

## 2024-05-13 ENCOUNTER — TELEMEDICINE (OUTPATIENT)
Dept: BEHAVIORAL HEALTH | Facility: CLINIC | Age: 44
End: 2024-05-13
Payer: MEDICARE

## 2024-05-13 DIAGNOSIS — F71 MODERATE INTELLECTUAL DISABILITIES: ICD-10-CM

## 2024-05-13 DIAGNOSIS — F40.02 AGORAPHOBIA WITHOUT PANIC DISORDER: ICD-10-CM

## 2024-05-13 DIAGNOSIS — F42.2 MIXED OBSESSIONAL THOUGHTS AND ACTS: Primary | ICD-10-CM

## 2024-05-13 DIAGNOSIS — F41.1 GENERALIZED ANXIETY DISORDER: ICD-10-CM

## 2024-05-13 PROCEDURE — 99214 OFFICE O/P EST MOD 30 MIN: CPT | Performed by: PSYCHIATRY & NEUROLOGY

## 2024-05-13 PROCEDURE — 1036F TOBACCO NON-USER: CPT | Performed by: PSYCHIATRY & NEUROLOGY

## 2024-05-13 RX ORDER — ALPRAZOLAM 0.5 MG/1
TABLET ORAL
Qty: 75 TABLET | Refills: 2 | Status: SHIPPED | OUTPATIENT
Start: 2024-05-13

## 2024-05-13 RX ORDER — ALPRAZOLAM 1 MG/1
1 TABLET ORAL DAILY PRN
Qty: 30 TABLET | Refills: 1 | Status: SHIPPED | OUTPATIENT
Start: 2024-05-13

## 2024-05-13 RX ORDER — FLUVOXAMINE MALEATE 100 MG/1
100 TABLET, COATED ORAL 2 TIMES DAILY
Qty: 180 TABLET | Refills: 1 | Status: SHIPPED | OUTPATIENT
Start: 2024-05-13

## 2024-05-13 ASSESSMENT — ENCOUNTER SYMPTOMS
DYSURIA: 0
MYALGIAS: 0
TROUBLE SWALLOWING: 0
SLEEP DISTURBANCE: 1
ABDOMINAL PAIN: 0
SEIZURES: 0
ARTHRALGIAS: 0
DIZZINESS: 0
LIGHT-HEADEDNESS: 0
CONSTIPATION: 0
TREMORS: 0
ACTIVITY CHANGE: 0
COUGH: 0
CHOKING: 0

## 2024-05-13 NOTE — PROGRESS NOTES
"Outpatient Psychiatry    A HIPAA-compliant interactive audio and video telecommunication system which permits real time communications between the patient (at the originating site) and provider (at the distant site) was utilized to provide this telehealth service.     The patient, family, caregivers and guardian (as appropriate) have provided consent on this date to conduct treatment via this telehealth service.  The patient's identity and physical location were verified at the time of this visit.      Present for appointment: Sandra and mom/guardian (Ernestina).    SUBJECTIVE    Mom feels that Sandra has been \"worse\" in terms of rigid and inflexible O/C behaviors.  \"She's taken over the house\" and often feels compelled to control where her parents can sit, what can be put on the TV, etc.  She's very resistant to having any other family members over to the house to visit.  Mom says they do not have any current contacts with an assigned SSA through [employer] and seems to have mixed feelings about pursuing services through an independent provider or other agency.  Sandra is still up a lot at night engaging in rituals; might nap during the afternoon if she gets an extra dose of hydroxyzine.    Review of Systems   Constitutional:  Negative for activity change.   HENT:  Negative for drooling and trouble swallowing.    Respiratory:  Negative for cough and choking.    Cardiovascular:  Negative for chest pain.   Gastrointestinal:  Negative for abdominal pain and constipation.   Genitourinary:  Negative for dysuria and enuresis.   Musculoskeletal:  Negative for arthralgias, gait problem and myalgias.   Neurological:  Negative for dizziness, tremors, seizures, syncope and light-headedness.   Psychiatric/Behavioral:  Positive for behavioral problems and sleep disturbance. Negative for self-injury.       Controlled Substance Evaluation  OARRS/PDMP reviewed: Davon Robertson MD on 5/13/2024  7:19 AM  Is the patient prescribed a " combination of a benzodiazepine and opioid? No  I have personally reviewed the OARRS report for Sandra Rousseau.   I have considered the risks of abuse, dependence, addiction and diversion.    I believe that it is clinically appropriate for Sandra Rousseau to be prescribed this medication.    Last Urine Drug Screen:  Recent Results (from the past 8760 hour(s))   Confirmation Opiate/Opioid/Benzo Prescription Compliance    Collection Time: 01/05/24 10:49 AM   Result Value Ref Range    Clonazepam <25 <25 ng/mL    7-Aminoclonazepam <25 <25 ng/mL    Alprazolam 129 (H) <25 ng/mL    Alpha-Hydroxyalprazolam 45 (H) <25 ng/mL    Midazolam <25 <25 ng/mL    Alpha-Hydroxymidazolam <25 <25 ng/mL    Chlordiazepoxide <25 <25 ng/mL    Diazepam <25 <25 ng/mL    Nordiazepam <25 <25 ng/mL    Temazepam <25 <25 ng/mL    Oxazepam <25 <25 ng/mL    Lorazepam <25 <25 ng/mL    Methadone <25 <25 ng/mL    EDDP <25 <25 ng/mL    6-Acetylmorphine <25 <25 ng/mL    Codeine <50 <50 ng/mL    Hydrocodone <25 <25 ng/mL    Hydromorphone <25 <25 ng/mL    Morphine  <50 <50 ng/mL    Norhydrocodone <25 <25 ng/mL    Noroxycodone <25 <25 ng/mL    Oxycodone <25 <25 ng/mL    Oxymorphone <25 <25 ng/mL    Fentanyl <2.5 <2.5 ng/mL    Norfentanyl <2.5 <2.5 ng/mL    Tramadol <50 <50 ng/mL    O-Desmethyltramadol <50 <50 ng/mL    Zolpidem <25 <25 ng/mL    Zolpidem Metabolite (ZCA) <25 <25 ng/mL   Screen Opiate/Opioid/Benzo Prescription Compliance    Collection Time: 01/05/24 10:49 AM   Result Value Ref Range    Creatinine, Urine Random 27.7 20.0 - 320.0 mg/dL    Amphetamine Screen, Urine Presumptive Negative Presumptive Negative    Barbiturate Screen, Urine Presumptive Negative Presumptive Negative    Cannabinoid Screen, Urine Presumptive Negative Presumptive Negative    Cocaine Metabolite Screen, Urine Presumptive Negative Presumptive Negative    PCP Screen, Urine Presumptive Negative Presumptive Negative     Controlled Substance Agreement: 11/15/2023  Reviewed  Controlled Substance Agreement, including but not limited to:  Benefits, risks, and alternatives to treatment with a controlled substance medication(s).    Prescribed Controlled Substances:  > Benzodiazepines: Alprazolam  What is the patient's goal of therapy? Reduced anxiety/agitation  Is this being achieved with current treatment? Yes  Activities of Daily Living:   Is your overall impression that this patient is benefiting (symptom reduction outweighs side effects) from benzodiazepine therapy? Yes   1. Physical Functioning: Same  2. Family Relationship: Same  3. Social Relationship: Same  4. Mood: Same  5. Sleep Patterns: Same  6. Overall Function: Same     MEDICATION HISTORY  Aripiprazole - 40 pound weight gain  Buspirone - worse anxiety and OCD  Clomipramine - not helpful  Clonazepam - no better than alprazolam  Depakote - no benefit  Diazepam - no improvement  Duloxetine - no benefit  Fluoxetine - no obvious benefit  Naltrexone - decreased appetite but no change in OCD  Paroxetine - agitated and bizarre behaviors  Propranolol - Raynaud's flares  Risperidone - EPS  Sertraline - not helpful  Topiramate - did not tolerate, likely paresthesias    CURRENT MEDICATIONS    Current Outpatient Medications:     ALPRAZolam (Xanax) 0.5 mg tablet, Take 1 tablet up to 3 times per day, Disp: 75 tablet, Rfl: 2    cholecalciferol (Vitamin D-3) 5,000 Units tablet, Take by mouth., Disp: , Rfl:     fLuvoxaMINE (Luvox) 100 mg tablet, Take 1 tablet (100 mg) by mouth 2 times a day., Disp: 180 tablet, Rfl: 0    hydroCHLOROthiazide (Microzide) 12.5 mg capsule, Take 1 capsule (12.5 mg) by mouth once daily., Disp: , Rfl:     hydrOXYzine HCL (Atarax) 10 mg tablet, Take 1 tablet (10 mg) by mouth 2 times a day as needed for anxiety., Disp: , Rfl:     ketoconazole (NIZOral) 2 % cream, Apply topically., Disp: , Rfl:     ketoconazole (NIZOral) 2 % shampoo, Apply topically., Disp: , Rfl:     levothyroxine (Synthroid, Levoxyl) 75 mcg tablet, TAKE  1 TABLET BY MOUTH EVERY DAY AS DIRECTED, Disp: 90 tablet, Rfl: 3    medroxyPROGESTERone 150 mg/mL injection, Inject 1 mL (150 mg) into the muscle every 3 months., Disp: 1 mL, Rfl: 3    melatonin 10 mg tablet, Take 1 tablet (10 mg) by mouth., Disp: , Rfl:     mupirocin (Bactroban) 2 % ointment, Apply topically., Disp: , Rfl:     nystatin (Mycostatin) 100,000 unit/gram powder, Apply topically as needed at bedtime for rash., Disp: 60 g, Rfl: 2    omeprazole (PriLOSEC) 20 mg DR capsule, TAKE 1 CAPSULE BY MOUTH EVERY DAY, Disp: 90 capsule, Rfl: 3    PROGESTERONE INJECTION IN ETHYL OLEATE 50 MG/ML, , Disp: , Rfl:     SOCIAL HISTORY  Living situation Lives with parents   Provider agency None   Work or day program None   School N/A   Guardianship Mother (Ernestina)   SSA ?   Bx Specialist ?   Nicotine None   Alcohol None   Other drugs None     OBJECTIVE    Lab Results   Component Value Date    HGB 12.3 01/05/2024     01/05/2024    NEUTROABS 5.54 01/05/2024    GLUCOSE 80 01/05/2024     01/05/2024    K 4.1 01/05/2024    CO2 28 01/05/2024    CALCIUM 9.6 01/05/2024    CREATININE 0.94 01/05/2024    AST 14 01/05/2024    ALT 14 01/05/2024    HGBA1C 5.3 01/05/2024    TSH 3.22 01/05/2024    FREET4 1.26 01/05/2024    CHOL 184 01/05/2024    LDLF 116 (H) 01/18/2023    TRIG 64 01/05/2024    VITD25 63 12/30/2019     Therapeutic Drug Monitoring  N/A    Electrocardiograms  None in chart    MENTAL STATUS EXAM  General/Appearance: Appropriate dress/hygiene/grooming.  Attitude/Behavior: Minimally engaged. Non-specific eye contact.  Speech/Communication: Apraxic.  Motor: Fidgets. Restless.  Gait: Not assessed at this visit.  Mood: Anxious.  Affect: Anxious.  Thought processes: Goal-directed.  Thought content: Unable to assess.  Perception: Does not appear to be experiencing or responding to hallucinations.  Sensorium/Cognition: Oriented to self and surroundings. Intellectual impairment. Minimal interest in participating.  Memory: Not  directly assessed at this time.  Insight: Absent.  Judgment: Redirectable.    ASSESSMENT  Sandra's anxiety and O/C rituals are mostly unchanged, and possibly somewhat worse according to her parents.  Re-connecting with [facility] board of  services could be helpful.  Given adverse responses to past treatment trials I am skeptical that trying anything new would be very helpful.  We could try using a slightly higher dose of alprazolam at one of her daily doses to see if this helps reduce anxiety and associated O/C behaviors.    PROBLEM LIST  Intellectual developmental disorder, moderate  OCD  TORY  Agoraphobia    PLAN  -- Increase alprazolam to 1mg in the morning and 0.5mg in the afternoon for anxiety and OCD  -- Continue fluvoxamine 100mg BID for anxiety and OCD  -- Continue melatonin 10mg QHS for sleep  -- OTC CBD capsules 15mg once daily for anxiety  -- PRN: hydroxyzine 10mg to 20mg daily for anxiety  -- PRN: alprazolam 0.5mg once daily for severe anxiety  -- Follow up [facility] at [facility]    Davon Robertson MD    Prep time on date of the patient encounter: 5 minutes   Time spent directly with patient/family/caregiver: 25 minutes   Additional time spent on patient care activities: 0 minutes   Documentation time: 5 minutes   Other time spent: 0 minutes   Total time on date of patient encounter: 35 minutes

## 2024-05-15 ENCOUNTER — APPOINTMENT (OUTPATIENT)
Dept: RADIOLOGY | Facility: CLINIC | Age: 44
End: 2024-05-15
Payer: MEDICARE

## 2024-05-22 ENCOUNTER — HOSPITAL ENCOUNTER (OUTPATIENT)
Dept: RADIOLOGY | Facility: CLINIC | Age: 44
Discharge: HOME | End: 2024-05-22
Payer: MEDICARE

## 2024-05-22 VITALS — BODY MASS INDEX: 37.79 KG/M2 | WEIGHT: 180 LBS | HEIGHT: 58 IN

## 2024-05-22 DIAGNOSIS — Z12.31 VISIT FOR SCREENING MAMMOGRAM: ICD-10-CM

## 2024-05-22 PROCEDURE — 77067 SCR MAMMO BI INCL CAD: CPT | Performed by: RADIOLOGY

## 2024-05-22 PROCEDURE — 77063 BREAST TOMOSYNTHESIS BI: CPT | Performed by: RADIOLOGY

## 2024-05-22 PROCEDURE — 77067 SCR MAMMO BI INCL CAD: CPT

## 2024-08-12 ENCOUNTER — APPOINTMENT (OUTPATIENT)
Dept: BEHAVIORAL HEALTH | Facility: CLINIC | Age: 44
End: 2024-08-12
Payer: MEDICARE

## 2024-08-12 DIAGNOSIS — F42.2 MIXED OBSESSIONAL THOUGHTS AND ACTS: ICD-10-CM

## 2024-08-12 DIAGNOSIS — F71 MODERATE INTELLECTUAL DISABILITIES: ICD-10-CM

## 2024-08-12 DIAGNOSIS — F40.02 AGORAPHOBIA WITHOUT PANIC DISORDER: ICD-10-CM

## 2024-08-12 DIAGNOSIS — F41.1 GENERALIZED ANXIETY DISORDER: ICD-10-CM

## 2024-08-12 PROCEDURE — 99214 OFFICE O/P EST MOD 30 MIN: CPT | Performed by: PSYCHIATRY & NEUROLOGY

## 2024-08-12 PROCEDURE — 1036F TOBACCO NON-USER: CPT | Performed by: PSYCHIATRY & NEUROLOGY

## 2024-08-12 RX ORDER — FLUVOXAMINE MALEATE 100 MG/1
100 TABLET, COATED ORAL 2 TIMES DAILY
Qty: 180 TABLET | Refills: 3 | Status: SHIPPED | OUTPATIENT
Start: 2024-08-12

## 2024-08-12 RX ORDER — ALPRAZOLAM 0.5 MG/1
TABLET ORAL
Qty: 75 TABLET | Refills: 2 | Status: SHIPPED | OUTPATIENT
Start: 2024-08-12

## 2024-08-12 ASSESSMENT — ENCOUNTER SYMPTOMS
TROUBLE SWALLOWING: 0
ARTHRALGIAS: 0
SEIZURES: 0
ABDOMINAL PAIN: 0
LIGHT-HEADEDNESS: 0
DIZZINESS: 0
CHOKING: 0
ACTIVITY CHANGE: 0
CONSTIPATION: 0
SLEEP DISTURBANCE: 1
COUGH: 0
DYSURIA: 0
MYALGIAS: 0
TREMORS: 0

## 2024-08-12 NOTE — PROGRESS NOTES
"Outpatient Adult IDD Psychiatry    A HIPAA-compliant interactive audio and video telecommunication system which permits real time communications between the patient (at the originating site) and provider (at the distant site) was utilized to provide this telehealth service.     The patient, family, caregivers and guardian (as appropriate) have provided consent on this date to conduct treatment via this telehealth service.  The patient's identity and physical location were verified at the time of this visit.      Present for appointment: Sandra and mom/guardian (Ernestina).    SUBJECTIVE    No new/acute health issues for Sandra since last visit.    Seemed a bit overly-sedated with the 1mg dose of alprazolam in the morning; seems to be tolerating 0.75mg fairly well, but with only minimal reduction in anxiety and/or obsessive/compulsive behaviors.    She will often get \"pushy\" or demanding if not able to arrange/organize things at home the way she wants to.    No changes to sleep patterns.    She had one or two episodes of urinary incontinence during the daytime when using the higher dose of alprazolam.    Review of Systems   Constitutional:  Negative for activity change.   HENT:  Negative for drooling and trouble swallowing.    Respiratory:  Negative for cough and choking.    Cardiovascular:  Negative for chest pain.   Gastrointestinal:  Negative for abdominal pain and constipation.   Genitourinary:  Negative for dysuria and enuresis.   Musculoskeletal:  Negative for arthralgias, gait problem and myalgias.   Neurological:  Negative for dizziness, tremors, seizures, syncope and light-headedness.   Psychiatric/Behavioral:  Positive for behavioral problems and sleep disturbance. Negative for self-injury.       Controlled Substance Evaluation  OARRS/PDMP reviewed: Davon Robertson MD on 8/12/2024  6:35 AM  Is the patient prescribed a combination of a benzodiazepine and opioid? No  I have personally reviewed the OARRS report " for Sandra Rousseau.   I have considered the risks of abuse, dependence, addiction and diversion.    I believe that it is clinically appropriate for Sandra Rousseau to be prescribed this medication.    Last Urine Drug Screen:  Recent Results (from the past 8760 hour(s))   Confirmation Opiate/Opioid/Benzo Prescription Compliance    Collection Time: 01/05/24 10:49 AM   Result Value Ref Range    Clonazepam <25 <25 ng/mL    7-Aminoclonazepam <25 <25 ng/mL    Alprazolam 129 (H) <25 ng/mL    Alpha-Hydroxyalprazolam 45 (H) <25 ng/mL    Midazolam <25 <25 ng/mL    Alpha-Hydroxymidazolam <25 <25 ng/mL    Chlordiazepoxide <25 <25 ng/mL    Diazepam <25 <25 ng/mL    Nordiazepam <25 <25 ng/mL    Temazepam <25 <25 ng/mL    Oxazepam <25 <25 ng/mL    Lorazepam <25 <25 ng/mL    Methadone <25 <25 ng/mL    EDDP <25 <25 ng/mL    6-Acetylmorphine <25 <25 ng/mL    Codeine <50 <50 ng/mL    Hydrocodone <25 <25 ng/mL    Hydromorphone <25 <25 ng/mL    Morphine  <50 <50 ng/mL    Norhydrocodone <25 <25 ng/mL    Noroxycodone <25 <25 ng/mL    Oxycodone <25 <25 ng/mL    Oxymorphone <25 <25 ng/mL    Fentanyl <2.5 <2.5 ng/mL    Norfentanyl <2.5 <2.5 ng/mL    Tramadol <50 <50 ng/mL    O-Desmethyltramadol <50 <50 ng/mL    Zolpidem <25 <25 ng/mL    Zolpidem Metabolite (ZCA) <25 <25 ng/mL   Screen Opiate/Opioid/Benzo Prescription Compliance    Collection Time: 01/05/24 10:49 AM   Result Value Ref Range    Creatinine, Urine Random 27.7 20.0 - 320.0 mg/dL    Amphetamine Screen, Urine Presumptive Negative Presumptive Negative    Barbiturate Screen, Urine Presumptive Negative Presumptive Negative    Cannabinoid Screen, Urine Presumptive Negative Presumptive Negative    Cocaine Metabolite Screen, Urine Presumptive Negative Presumptive Negative    PCP Screen, Urine Presumptive Negative Presumptive Negative     Controlled Substance Agreement: 11/15/2023  Reviewed Controlled Substance Agreement, including but not limited to:  Benefits, risks, and alternatives  to treatment with a controlled substance medication(s).    Prescribed Controlled Substances:  > Benzodiazepines: Alprazolam  What is the patient's goal of therapy? Reduced anxiety/agitation  Is this being achieved with current treatment? Yes  Activities of Daily Living:   Is your overall impression that this patient is benefiting (symptom reduction outweighs side effects) from benzodiazepine therapy? Yes   1. Physical Functioning: Same  2. Family Relationship: Same  3. Social Relationship: Same  4. Mood: Same  5. Sleep Patterns: Same  6. Overall Function: Same     MEDICATION HISTORY  Aripiprazole - 40 pound weight gain  Buspirone - worse anxiety and OCD  Clomipramine - not helpful  Clonazepam - no better than alprazolam  Depakote - no benefit  Diazepam - no improvement  Duloxetine - no benefit  Fluoxetine - no obvious benefit  Naltrexone - decreased appetite but no change in OCD  Paroxetine - agitated and bizarre behaviors  Propranolol - Raynaud's flares  Risperidone - EPS  Sertraline - not helpful  Topiramate - did not tolerate, likely paresthesias    CURRENT MEDICATIONS    Current Outpatient Medications:     ALPRAZolam (Xanax) 0.5 mg tablet, Take 1 tablet up to 3 times per day, Disp: 75 tablet, Rfl: 2    ALPRAZolam (Xanax) 1 mg tablet, Take 1 tablet (1 mg) by mouth once daily as needed for anxiety., Disp: 30 tablet, Rfl: 1    cholecalciferol (Vitamin D-3) 5,000 Units tablet, Take by mouth., Disp: , Rfl:     fLuvoxaMINE (Luvox) 100 mg tablet, Take 1 tablet (100 mg) by mouth 2 times a day., Disp: 180 tablet, Rfl: 1    hydroCHLOROthiazide (Microzide) 12.5 mg capsule, Take 1 capsule (12.5 mg) by mouth once daily., Disp: , Rfl:     hydrOXYzine HCL (Atarax) 10 mg tablet, Take 1 tablet (10 mg) by mouth 2 times a day as needed for anxiety., Disp: , Rfl:     ketoconazole (NIZOral) 2 % cream, Apply topically., Disp: , Rfl:     ketoconazole (NIZOral) 2 % shampoo, Apply topically., Disp: , Rfl:     levothyroxine (Synthroid,  Levoxyl) 75 mcg tablet, TAKE 1 TABLET BY MOUTH EVERY DAY AS DIRECTED, Disp: 90 tablet, Rfl: 3    medroxyPROGESTERone 150 mg/mL injection, Inject 1 mL (150 mg) into the muscle every 3 months., Disp: 1 mL, Rfl: 3    melatonin 10 mg tablet, Take 1 tablet (10 mg) by mouth., Disp: , Rfl:     mupirocin (Bactroban) 2 % ointment, Apply topically., Disp: , Rfl:     nystatin (Mycostatin) 100,000 unit/gram powder, Apply topically as needed at bedtime for rash., Disp: 60 g, Rfl: 2    omeprazole (PriLOSEC) 20 mg DR capsule, TAKE 1 CAPSULE BY MOUTH EVERY DAY, Disp: 90 capsule, Rfl: 3    PROGESTERONE INJECTION IN ETHYL OLEATE 50 MG/ML, , Disp: , Rfl:     SOCIAL HISTORY  Living situation Lives with parents   Provider agency None   Work or day program None   School N/A   Guardianship Mother (Ernestina)   SSA ?   Bx Specialist ?   Nicotine None   Alcohol None   Other drugs None     OBJECTIVE    Lab Results   Component Value Date    HGB 12.3 01/05/2024     01/05/2024    NEUTROABS 5.54 01/05/2024    GLUCOSE 80 01/05/2024     01/05/2024    K 4.1 01/05/2024    CO2 28 01/05/2024    CALCIUM 9.6 01/05/2024    CREATININE 0.94 01/05/2024    AST 14 01/05/2024    ALT 14 01/05/2024    HGBA1C 5.3 01/05/2024    TSH 3.22 01/05/2024    FREET4 1.26 01/05/2024    CHOL 184 01/05/2024    LDLF 116 (H) 01/18/2023    TRIG 64 01/05/2024    VITD25 63 12/30/2019     Therapeutic Drug Monitoring  N/A    Electrocardiograms  None in chart    MENTAL STATUS EXAM  General/Appearance: Appropriate dress/hygiene/grooming.  Attitude/Behavior: Minimally engaged. Non-specific eye contact.  Speech/Communication: Apraxic.  Motor: Fidgets. Restless.  Gait: Not assessed at this visit.  Mood: Neutral.  Affect: Neutral.  Thought processes: [place].  Thought content: Unable to assess.  Perception: Does not appear to be experiencing or responding to hallucinations.  Sensorium/Cognition: Oriented to self and surroundings. Intellectual impairment. Minimal interest in  participating.  Memory: Not directly assessed at this time.  Insight: Absent.  Judgment: Redirectable.    ASSESSMENT  No changes to anxiety or OCD by increasing alprazolam -- did not seem to tolerate the higher dose.  No treatment changes at this time.    PROBLEM LIST  Intellectual developmental disorder, moderate  OCD  TORY  Agoraphobia    PLAN  -- Continue alprazolam 0.5mg twice daily for anxiety and OCD  -- Continue fluvoxamine 100mg BID for anxiety and OCD  -- Continue melatonin 10mg QHS for sleep  -- OTC CBD capsules 15mg once daily for anxiety  -- PRN: hydroxyzine 10mg to 20mg daily for anxiety  -- PRN: alprazolam 0.25mg to 0.5mg once daily for severe anxiety  -- Follow up 3 months    Davon Robertson MD    Prep time on date of the patient encounter: 5 minutes   Time spent directly with patient/family/caregiver: 25 minutes   Additional time spent on patient care activities: 0 minutes   Documentation time: 5 minutes   Other time spent: 0 minutes   Total time on date of patient encounter: 35 minutes

## 2024-11-18 ENCOUNTER — APPOINTMENT (OUTPATIENT)
Dept: BEHAVIORAL HEALTH | Facility: CLINIC | Age: 44
End: 2024-11-18
Payer: MEDICARE

## 2024-11-18 VITALS
SYSTOLIC BLOOD PRESSURE: 118 MMHG | HEART RATE: 81 BPM | DIASTOLIC BLOOD PRESSURE: 86 MMHG | HEIGHT: 58 IN | WEIGHT: 172.7 LBS | BODY MASS INDEX: 36.25 KG/M2 | RESPIRATION RATE: 18 BRPM

## 2024-11-18 DIAGNOSIS — F42.2 MIXED OBSESSIONAL THOUGHTS AND ACTS: Primary | ICD-10-CM

## 2024-11-18 DIAGNOSIS — F71 MODERATE INTELLECTUAL DISABILITIES: ICD-10-CM

## 2024-11-18 DIAGNOSIS — F41.1 GENERALIZED ANXIETY DISORDER: ICD-10-CM

## 2024-11-18 DIAGNOSIS — F40.02 AGORAPHOBIA WITHOUT PANIC DISORDER: ICD-10-CM

## 2024-11-18 DIAGNOSIS — Z79.899 HIGH RISK MEDICATION USE: ICD-10-CM

## 2024-11-18 PROCEDURE — 99214 OFFICE O/P EST MOD 30 MIN: CPT | Performed by: PSYCHIATRY & NEUROLOGY

## 2024-11-18 PROCEDURE — G2211 COMPLEX E/M VISIT ADD ON: HCPCS | Performed by: PSYCHIATRY & NEUROLOGY

## 2024-11-18 PROCEDURE — 1036F TOBACCO NON-USER: CPT | Performed by: PSYCHIATRY & NEUROLOGY

## 2024-11-18 PROCEDURE — 3008F BODY MASS INDEX DOCD: CPT | Performed by: PSYCHIATRY & NEUROLOGY

## 2024-11-18 RX ORDER — ALPRAZOLAM 0.5 MG/1
TABLET ORAL
Qty: 100 TABLET | Refills: 2 | Status: SHIPPED | OUTPATIENT
Start: 2024-11-18 | End: 2024-11-20 | Stop reason: DRUGHIGH

## 2024-11-18 ASSESSMENT — ENCOUNTER SYMPTOMS
SLEEP DISTURBANCE: 1
SEIZURES: 0
CONSTIPATION: 0
MYALGIAS: 0
DIZZINESS: 0
ARTHRALGIAS: 0
CHOKING: 0
COUGH: 0
TREMORS: 0
ACTIVITY CHANGE: 0
TROUBLE SWALLOWING: 0
DYSURIA: 0
LIGHT-HEADEDNESS: 0
ABDOMINAL PAIN: 0

## 2024-11-18 ASSESSMENT — PAIN SCALES - GENERAL: PAINLEVEL_OUTOF10: 0-NO PAIN

## 2024-11-18 NOTE — PROGRESS NOTES
Outpatient Adult IDD Psychiatry    Present for appointment: Sandra and mom/guardian (Ernestina).    SUBJECTIVE    No new/acute health issues for Sandra since last visit.  Due to see PCP for annual physical exam in early 2025.    She has not had any change in her obsessive or ritualized behaviors.    Generally seems content as long as she isn't required to leave the house.    Current dose of alprazolam (0.75mg BID) seems to be working fairly well for managing anxiety.    She has been going to bed earlier (19:00-20:00) and waking earlier (04:00-05:00) since the end of DST.  She does not nap during the day.    Weight/appetite are stable.  No apparent GI problems.    Minimal or infrequent episodes of daytime urinary incontinence reported by mom.    No concerns about cognitive decline/regression.    No witnessed/observed falls or seizures.    Review of Systems   Constitutional:  Negative for activity change.   HENT:  Negative for drooling and trouble swallowing.    Respiratory:  Negative for cough and choking.    Cardiovascular:  Negative for chest pain.   Gastrointestinal:  Negative for abdominal pain and constipation.   Genitourinary:  Negative for dysuria and enuresis.   Musculoskeletal:  Negative for arthralgias, gait problem and myalgias.   Neurological:  Negative for dizziness, tremors, seizures, syncope and light-headedness.   Psychiatric/Behavioral:  Positive for behavioral problems and sleep disturbance. Negative for self-injury.       Controlled Substance Evaluation  OARRS/PDMP reviewed: Davon Robertson MD on 11/18/2024 11:09 AM  Is the patient prescribed a combination of a benzodiazepine and opioid? No  I have personally reviewed the OARRS report for Sandra Rousseau.   I have considered the risks of abuse, dependence, addiction and diversion.    I believe that it is clinically appropriate for Sandra Rousseau to be prescribed this medication.    Last Urine Drug Screen:  Recent Results (from the past 8760 hours)    Confirmation Opiate/Opioid/Benzo Prescription Compliance    Collection Time: 01/05/24 10:49 AM   Result Value Ref Range    Clonazepam <25 <25 ng/mL    7-Aminoclonazepam <25 <25 ng/mL    Alprazolam 129 (H) <25 ng/mL    Alpha-Hydroxyalprazolam 45 (H) <25 ng/mL    Midazolam <25 <25 ng/mL    Alpha-Hydroxymidazolam <25 <25 ng/mL    Chlordiazepoxide <25 <25 ng/mL    Diazepam <25 <25 ng/mL    Nordiazepam <25 <25 ng/mL    Temazepam <25 <25 ng/mL    Oxazepam <25 <25 ng/mL    Lorazepam <25 <25 ng/mL    Methadone <25 <25 ng/mL    EDDP <25 <25 ng/mL    6-Acetylmorphine <25 <25 ng/mL    Codeine <50 <50 ng/mL    Hydrocodone <25 <25 ng/mL    Hydromorphone <25 <25 ng/mL    Morphine  <50 <50 ng/mL    Norhydrocodone <25 <25 ng/mL    Noroxycodone <25 <25 ng/mL    Oxycodone <25 <25 ng/mL    Oxymorphone <25 <25 ng/mL    Fentanyl <2.5 <2.5 ng/mL    Norfentanyl <2.5 <2.5 ng/mL    Tramadol <50 <50 ng/mL    O-Desmethyltramadol <50 <50 ng/mL    Zolpidem <25 <25 ng/mL    Zolpidem Metabolite (ZCA) <25 <25 ng/mL   Screen Opiate/Opioid/Benzo Prescription Compliance    Collection Time: 01/05/24 10:49 AM   Result Value Ref Range    Creatinine, Urine Random 27.7 20.0 - 320.0 mg/dL    Amphetamine Screen, Urine Presumptive Negative Presumptive Negative    Barbiturate Screen, Urine Presumptive Negative Presumptive Negative    Cannabinoid Screen, Urine Presumptive Negative Presumptive Negative    Cocaine Metabolite Screen, Urine Presumptive Negative Presumptive Negative    PCP Screen, Urine Presumptive Negative Presumptive Negative     Controlled Substance Agreement: 11/18/2024  Reviewed Controlled Substance Agreement, including but not limited to:  Benefits, risks, and alternatives to treatment with a controlled substance medication(s).    Prescribed Controlled Substances:  > Benzodiazepines: Alprazolam  What is the patient's goal of therapy? Reduced anxiety/agitation  Is this being achieved with current treatment? Yes  Activities of Daily Living:    Is your overall impression that this patient is benefiting (symptom reduction outweighs side effects) from benzodiazepine therapy? Yes   1. Physical Functioning: Same  2. Family Relationship: Same  3. Social Relationship: Same  4. Mood: Same  5. Sleep Patterns: Same  6. Overall Function: Same     MEDICATION HISTORY  Aripiprazole - 40 pound weight gain  Buspirone - worse anxiety and OCD  Clomipramine - not helpful  Clonazepam - no better than alprazolam  Depakote - no benefit  Diazepam - no improvement  Duloxetine - no benefit  Fluoxetine - no obvious benefit  Naltrexone - decreased appetite but no change in OCD  Paroxetine - agitated and bizarre behaviors  Propranolol - Raynaud's flares  Risperidone - EPS  Sertraline - not helpful  Topiramate - did not tolerate, likely paresthesias    CURRENT MEDICATIONS    Current Outpatient Medications:     ALPRAZolam (Xanax) 0.5 mg tablet, Take 1 tablet up to 3 times per day, Disp: 75 tablet, Rfl: 2    cholecalciferol (Vitamin D-3) 5,000 Units tablet, Take by mouth., Disp: , Rfl:     fLuvoxaMINE (Luvox) 100 mg tablet, Take 1 tablet (100 mg) by mouth 2 times a day., Disp: 180 tablet, Rfl: 3    hydroCHLOROthiazide (Microzide) 12.5 mg capsule, Take 1 capsule (12.5 mg) by mouth once daily., Disp: , Rfl:     hydrOXYzine HCL (Atarax) 10 mg tablet, Take 1 tablet (10 mg) by mouth 2 times a day as needed for anxiety., Disp: , Rfl:     ketoconazole (NIZOral) 2 % cream, Apply topically., Disp: , Rfl:     ketoconazole (NIZOral) 2 % shampoo, Apply topically., Disp: , Rfl:     levothyroxine (Synthroid, Levoxyl) 75 mcg tablet, TAKE 1 TABLET BY MOUTH EVERY DAY AS DIRECTED, Disp: 90 tablet, Rfl: 3    medroxyPROGESTERone 150 mg/mL injection, Inject 1 mL (150 mg) into the muscle every 3 months., Disp: 1 mL, Rfl: 3    melatonin 10 mg tablet, Take 1 tablet (10 mg) by mouth., Disp: , Rfl:     mupirocin (Bactroban) 2 % ointment, Apply topically., Disp: , Rfl:     nystatin (Mycostatin) 100,000  "unit/gram powder, Apply topically as needed at bedtime for rash., Disp: 60 g, Rfl: 2    omeprazole (PriLOSEC) 20 mg DR capsule, TAKE 1 CAPSULE BY MOUTH EVERY DAY, Disp: 90 capsule, Rfl: 3    PROGESTERONE INJECTION IN ETHYL OLEATE 50 MG/ML, , Disp: , Rfl:     SOCIAL HISTORY  Living situation Lives with parents   Provider agency None   Work or day program None   School N/A   Guardianship Mother (Ernestina)   SSA ?   Bx Specialist ?   Nicotine None   Alcohol None   Other drugs None     OBJECTIVE    Visit Vitals  /86   Pulse 81   Resp 18   Ht 1.473 m (4' 10\")   Wt 78.3 kg (172 lb 11.2 oz)   BMI 36.09 kg/m²   OB Status Implant   Smoking Status Never   BSA 1.79 m²     Lab Results   Component Value Date    HGB 12.3 01/05/2024     01/05/2024    NEUTROABS 5.54 01/05/2024    GLUCOSE 80 01/05/2024     01/05/2024    K 4.1 01/05/2024    CO2 28 01/05/2024    CALCIUM 9.6 01/05/2024    CREATININE 0.94 01/05/2024    AST 14 01/05/2024    ALT 14 01/05/2024    HGBA1C 5.3 01/05/2024    TSH 3.22 01/05/2024    FREET4 1.26 01/05/2024    CHOL 184 01/05/2024    LDLF 116 (H) 01/18/2023    TRIG 64 01/05/2024    VITD25 63 12/30/2019     Therapeutic Drug Monitoring  N/A    Electrocardiograms  None in chart    MENTAL STATUS EXAM  General/Appearance: Appropriate dress/hygiene/grooming.  Attitude/Behavior: Minimally engaged. Non-specific eye contact. Stands next to me at the desk.  Speech/Communication: Apraxic.  Motor: Fidgets. Restless.  Gait: Not assessed at this visit.  Mood: Neutral.  Affect: Neutral.  Thought processes: [place].  Thought content: Unable to assess.  Perception: Does not appear to be experiencing or responding to hallucinations.  Sensorium/Cognition: Oriented to self and surroundings. Intellectual impairment. Minimal interest in participating.  Memory: Not directly assessed at this time.  Insight: Absent.  Judgment: Redirectable.    ASSESSMENT  No significant changes to anxiety or OCD.  Seems to be stable on " current treatment regimen.  No recommended changes at this time.    PROBLEM LIST  Intellectual developmental disorder, moderate  OCD  TORY  Agoraphobia    PLAN  -- Continue fluvoxamine 100mg BID for anxiety and OCD  -- Continue alprazolam 0.75mg twice daily for anxiety and OCD  -- Continue melatonin 10mg QHS for sleep  -- OTC CBD capsules 15mg once daily for anxiety  -- PRN: hydroxyzine 10mg to 20mg daily for anxiety  -- PRN: alprazolam 0.5mg to 1mg once daily for severe anxiety  -- Follow up 3 months    Davon Robertson MD    Prep time on date of the patient encounter: 5 minutes   Time spent directly with patient/family/caregiver: 25 minutes   Additional time spent on patient care activities: 0 minutes   Documentation time: 5 minutes   Other time spent: 0 minutes   Total time on date of patient encounter: 35 minutes

## 2024-11-20 DIAGNOSIS — F41.1 GENERALIZED ANXIETY DISORDER: ICD-10-CM

## 2024-11-20 DIAGNOSIS — F42.2 MIXED OBSESSIONAL THOUGHTS AND ACTS: ICD-10-CM

## 2024-11-20 DIAGNOSIS — F40.02 AGORAPHOBIA WITHOUT PANIC DISORDER: ICD-10-CM

## 2024-11-20 RX ORDER — ALPRAZOLAM 1 MG/1
TABLET ORAL
Qty: 60 TABLET | Refills: 2 | Status: SHIPPED | OUTPATIENT
Start: 2024-11-20

## 2024-12-07 ENCOUNTER — OFFICE VISIT (OUTPATIENT)
Dept: URGENT CARE | Age: 44
End: 2024-12-07
Payer: MEDICARE

## 2024-12-07 VITALS
HEART RATE: 94 BPM | RESPIRATION RATE: 18 BRPM | WEIGHT: 170 LBS | TEMPERATURE: 98.1 F | OXYGEN SATURATION: 98 % | BODY MASS INDEX: 35.53 KG/M2 | SYSTOLIC BLOOD PRESSURE: 120 MMHG | DIASTOLIC BLOOD PRESSURE: 56 MMHG

## 2024-12-07 DIAGNOSIS — B96.89 SUPERFICIAL BACTERIAL INFECTION OF SKIN: Primary | ICD-10-CM

## 2024-12-07 DIAGNOSIS — L08.9 SUPERFICIAL BACTERIAL INFECTION OF SKIN: Primary | ICD-10-CM

## 2024-12-07 RX ORDER — SULFAMETHOXAZOLE AND TRIMETHOPRIM 800; 160 MG/1; MG/1
1 TABLET ORAL 2 TIMES DAILY
Qty: 14 TABLET | Refills: 0 | Status: SHIPPED | OUTPATIENT
Start: 2024-12-07 | End: 2024-12-14

## 2024-12-07 NOTE — PROGRESS NOTES
Subjective   Patient ID: Sandra Rousseau is a 44 y.o. female. They present today with a chief complaint of Cellulitis (Left leg ).    History of Present Illness  Sandra is a 44 year old female non verbal presents with mother who is her primary caretaker with concern for LLE skin wounds, initially present a few days ago, mom believes likely from patient scratching it, yesterday noticed increased redness around wounds. No fevers. Mom has been cleaning and using topical mupirocin daily.           Past Medical History  Allergies as of 12/07/2024 - Reviewed 12/07/2024   Allergen Reaction Noted    Cephalexin Hives 10/11/2013    Iodinated contrast media Unknown and Hives 11/11/2023    Metoclopramide Other 10/11/2013    Metoclopramide hcl Fever 11/11/2023    Penicillins Hives 06/15/2023    Vancomycin Other 11/11/2023    Erythromycin Rash 11/11/2023    Sumycin [tetracycline] Rash 11/11/2023    Tetracyclines Rash 10/11/2013       (Not in a hospital admission)       Past Medical History:   Diagnosis Date    Anxiety     Biliary acute pancreatitis without necrosis or infection ([facility])     Pancreatitis, gallstone    Depression     Disease of thyroid gland     Obsessive-compulsive disorder     Panic disorder     Personal history of other diseases of the digestive system     History of pyloric stenosis    Personal history of other diseases of the digestive system     History of Barkley's esophagus       Past Surgical History:   Procedure Laterality Date    CHOLECYSTECTOMY  05/03/2018    Cholecystectomy    OTHER SURGICAL HISTORY  03/10/2020    Esophagogastroduodenoscopy        reports that she has never smoked. She has never used smokeless tobacco. She reports that she does not drink alcohol and does not use drugs.    Review of Systems  Review of Systems                               Objective    Vitals:    12/07/24 1159   BP: 120/56   Pulse: 94   Resp: 18   Temp: 36.7 °C (98.1 °F)   SpO2: 98%   Weight: 77.1 kg (170 lb)     No LMP  recorded. Patient has had an implant.    Physical Exam  Constitutional:       Appearance: Normal appearance.   HENT:      Head: Normocephalic and atraumatic.   Pulmonary:      Effort: Pulmonary effort is normal.   Skin:     General: Skin is warm and dry.      Comments: LLE with few linear abrasions to lateral leg. Mild surrounding erythema. Just distal more hypertrophic abrasion. No overlying warmth, induration or fluctuance.    Neurological:      Mental Status: She is alert.         Procedures    Point of Care Test & Imaging Results from this visit  No results found for this visit on 12/07/24.   No results found.    Diagnostic study results (if any) were reviewed by Karli Srinivasan PA-C.    Assessment/Plan   Allergies, medications, history, and pertinent labs/EKGs/Imaging reviewed by Karli Srinivasan PA-C.     Medical Decision Making    Sandra presents with mother with concern for infection to LLE skin wounds, suspected from Sandra scratching leg. Mild surrounding erythema without warmth or induration. Discussed with mom no signs of cellulitis today, continue daily wound care, will cover with bactrim. Close follow up for S/S of cellulitis as discussed with mom. Plan of care discussed with patient and/or family who verbalized understanding. Recommend Follow up with PCP. Advised seeking immediate emergency medical attention if symptoms fail to improve, worsen or any concerning symptoms arise. Patient/Guardian voiced full understanding and agreement to plan.      Orders and Diagnoses  Diagnoses and all orders for this visit:  Superficial bacterial infection of skin  -     sulfamethoxazole-trimethoprim (Bactrim DS) 800-160 mg tablet; Take 1 tablet by mouth 2 times a day for 7 days.      Medical Admin Record      Patient disposition: Home    Electronically signed by Karli Srinivasan PA-C  12:31 PM

## 2024-12-17 ENCOUNTER — TELEPHONE (OUTPATIENT)
Dept: BEHAVIORAL HEALTH | Facility: CLINIC | Age: 44
End: 2024-12-17
Payer: MEDICARE

## 2024-12-31 DIAGNOSIS — Z00.00 ENCOUNTER FOR GENERAL ADULT MEDICAL EXAMINATION WITHOUT ABNORMAL FINDINGS: ICD-10-CM

## 2024-12-31 NOTE — TELEPHONE ENCOUNTER
Pt last OV 2/6/2024    Requested Prescriptions     Pending Prescriptions Disp Refills    omeprazole (PriLOSEC) 20 mg DR capsule [Pharmacy Med Name: OMEPRAZOLE DR 20 MG CAPSULE] 30 capsule 0     Sig: Take 1 capsule (20 mg) by mouth once daily.

## 2025-01-02 RX ORDER — OMEPRAZOLE 20 MG/1
20 CAPSULE, DELAYED RELEASE ORAL DAILY
Qty: 30 CAPSULE | Refills: 0 | Status: SHIPPED | OUTPATIENT
Start: 2025-01-02 | End: 2025-02-01

## 2025-01-07 ENCOUNTER — TELEPHONE (OUTPATIENT)
Dept: PRIMARY CARE | Facility: CLINIC | Age: 45
End: 2025-01-07
Payer: MEDICARE

## 2025-01-07 DIAGNOSIS — R73.9 HYPERGLYCEMIA: ICD-10-CM

## 2025-01-07 DIAGNOSIS — R60.0 PERIPHERAL EDEMA: ICD-10-CM

## 2025-01-07 DIAGNOSIS — E03.9 ADULT HYPOTHYROIDISM: Primary | ICD-10-CM

## 2025-01-15 ENCOUNTER — LAB (OUTPATIENT)
Dept: LAB | Facility: LAB | Age: 45
End: 2025-01-15
Payer: MEDICARE

## 2025-01-15 DIAGNOSIS — R60.0 PERIPHERAL EDEMA: ICD-10-CM

## 2025-01-15 DIAGNOSIS — R73.9 HYPERGLYCEMIA: ICD-10-CM

## 2025-01-15 DIAGNOSIS — E03.9 ADULT HYPOTHYROIDISM: ICD-10-CM

## 2025-01-15 DIAGNOSIS — Z79.899 HIGH RISK MEDICATION USE: ICD-10-CM

## 2025-01-15 LAB
ALBUMIN SERPL BCP-MCNC: 4.2 G/DL (ref 3.4–5)
ALP SERPL-CCNC: 58 U/L (ref 33–110)
ALT SERPL W P-5'-P-CCNC: 14 U/L (ref 7–45)
ANION GAP SERPL CALC-SCNC: 16 MMOL/L (ref 10–20)
AST SERPL W P-5'-P-CCNC: 13 U/L (ref 9–39)
BASOPHILS # BLD AUTO: 0.01 X10*3/UL (ref 0–0.1)
BASOPHILS NFR BLD AUTO: 0.1 %
BILIRUB SERPL-MCNC: 0.4 MG/DL (ref 0–1.2)
BUN SERPL-MCNC: 15 MG/DL (ref 6–23)
CALCIUM SERPL-MCNC: 9.4 MG/DL (ref 8.6–10.6)
CHLORIDE SERPL-SCNC: 100 MMOL/L (ref 98–107)
CHOLEST SERPL-MCNC: 185 MG/DL (ref 0–199)
CHOLESTEROL/HDL RATIO: 3.7
CO2 SERPL-SCNC: 26 MMOL/L (ref 21–32)
CREAT SERPL-MCNC: 0.9 MG/DL (ref 0.5–1.05)
EGFRCR SERPLBLD CKD-EPI 2021: 81 ML/MIN/1.73M*2
EOSINOPHIL # BLD AUTO: 0.16 X10*3/UL (ref 0–0.7)
EOSINOPHIL NFR BLD AUTO: 2.2 %
ERYTHROCYTE [DISTWIDTH] IN BLOOD BY AUTOMATED COUNT: 16.9 % (ref 11.5–14.5)
EST. AVERAGE GLUCOSE BLD GHB EST-MCNC: 100 MG/DL
GLUCOSE SERPL-MCNC: 74 MG/DL (ref 74–99)
HBA1C MFR BLD: 5.1 %
HCT VFR BLD AUTO: 42 % (ref 36–46)
HDLC SERPL-MCNC: 49.7 MG/DL
HGB BLD-MCNC: 12.5 G/DL (ref 12–16)
IMM GRANULOCYTES # BLD AUTO: 0.04 X10*3/UL (ref 0–0.7)
IMM GRANULOCYTES NFR BLD AUTO: 0.5 % (ref 0–0.9)
LDLC SERPL CALC-MCNC: 123 MG/DL
LYMPHOCYTES # BLD AUTO: 1.97 X10*3/UL (ref 1.2–4.8)
LYMPHOCYTES NFR BLD AUTO: 27 %
MCH RBC QN AUTO: 24.2 PG (ref 26–34)
MCHC RBC AUTO-ENTMCNC: 29.8 G/DL (ref 32–36)
MCV RBC AUTO: 81 FL (ref 80–100)
MONOCYTES # BLD AUTO: 0.4 X10*3/UL (ref 0.1–1)
MONOCYTES NFR BLD AUTO: 5.5 %
NEUTROPHILS # BLD AUTO: 4.72 X10*3/UL (ref 1.2–7.7)
NEUTROPHILS NFR BLD AUTO: 64.7 %
NON HDL CHOLESTEROL: 135 MG/DL (ref 0–149)
NRBC BLD-RTO: 0 /100 WBCS (ref 0–0)
PLATELET # BLD AUTO: 184 X10*3/UL (ref 150–450)
POTASSIUM SERPL-SCNC: 3.5 MMOL/L (ref 3.5–5.3)
PROT SERPL-MCNC: 7.1 G/DL (ref 6.4–8.2)
RBC # BLD AUTO: 5.17 X10*6/UL (ref 4–5.2)
SODIUM SERPL-SCNC: 138 MMOL/L (ref 136–145)
T4 FREE SERPL-MCNC: 1.51 NG/DL (ref 0.78–1.48)
TRIGL SERPL-MCNC: 62 MG/DL (ref 0–149)
TSH SERPL-ACNC: 1.84 MIU/L (ref 0.44–3.98)
VLDL: 12 MG/DL (ref 0–40)
WBC # BLD AUTO: 7.3 X10*3/UL (ref 4.4–11.3)

## 2025-01-15 PROCEDURE — 80346 BENZODIAZEPINES1-12: CPT | Performed by: PSYCHIATRY & NEUROLOGY

## 2025-01-17 ENCOUNTER — LAB (OUTPATIENT)
Dept: LAB | Facility: LAB | Age: 45
End: 2025-01-17
Payer: MEDICARE

## 2025-01-17 LAB
1OH-MIDAZOLAM UR CFM-MCNC: <25 NG/ML
6MAM UR CFM-MCNC: <25 NG/ML
7AMINOCLONAZEPAM UR CFM-MCNC: <25 NG/ML
A-OH ALPRAZ UR CFM-MCNC: 68 NG/ML
ALPRAZ UR CFM-MCNC: 199 NG/ML
AMPHETAMINES UR QL SCN: NORMAL
BARBITURATES UR QL SCN: NORMAL
BZE UR QL SCN: NORMAL
CANNABINOIDS UR QL SCN: NORMAL
CHLORDIAZEP UR CFM-MCNC: <25 NG/ML
CLONAZEPAM UR CFM-MCNC: <25 NG/ML
CODEINE UR CFM-MCNC: <50 NG/ML
CREAT UR-MCNC: 96.3 MG/DL (ref 20–320)
DIAZEPAM UR CFM-MCNC: <25 NG/ML
EDDP UR CFM-MCNC: <25 NG/ML
FENTANYL UR CFM-MCNC: <2.5 NG/ML
HYDROCODONE CTO UR CFM-MCNC: <25 NG/ML
HYDROMORPHONE UR CFM-MCNC: <25 NG/ML
LORAZEPAM UR CFM-MCNC: <25 NG/ML
METHADONE UR CFM-MCNC: <25 NG/ML
MIDAZOLAM UR CFM-MCNC: <25 NG/ML
MORPHINE UR CFM-MCNC: <50 NG/ML
NORDIAZEPAM UR CFM-MCNC: <25 NG/ML
NORFENTANYL UR CFM-MCNC: <2.5 NG/ML
NORHYDROCODONE UR CFM-MCNC: <25 NG/ML
NOROXYCODONE UR CFM-MCNC: <25 NG/ML
NORTRAMADOL UR-MCNC: <50 NG/ML
OXAZEPAM UR CFM-MCNC: <25 NG/ML
OXYCODONE UR CFM-MCNC: <25 NG/ML
OXYMORPHONE UR CFM-MCNC: <25 NG/ML
PCP UR QL SCN: NORMAL
TEMAZEPAM UR CFM-MCNC: <25 NG/ML
TRAMADOL UR CFM-MCNC: <50 NG/ML
ZOLPIDEM UR CFM-MCNC: <25 NG/ML
ZOLPIDEM UR-MCNC: <25 NG/ML

## 2025-01-17 PROCEDURE — 82570 ASSAY OF URINE CREATININE: CPT

## 2025-01-17 PROCEDURE — 80307 DRUG TEST PRSMV CHEM ANLYZR: CPT

## 2025-01-18 LAB — HOLD SPECIMEN: NORMAL

## 2025-02-06 ENCOUNTER — APPOINTMENT (OUTPATIENT)
Dept: PRIMARY CARE | Facility: CLINIC | Age: 45
End: 2025-02-06
Payer: MEDICARE

## 2025-02-06 VITALS
DIASTOLIC BLOOD PRESSURE: 72 MMHG | WEIGHT: 174 LBS | HEART RATE: 86 BPM | BODY MASS INDEX: 36.37 KG/M2 | SYSTOLIC BLOOD PRESSURE: 116 MMHG

## 2025-02-06 DIAGNOSIS — Z00.00 MEDICARE ANNUAL WELLNESS VISIT, SUBSEQUENT: ICD-10-CM

## 2025-02-06 DIAGNOSIS — E03.9 ADULT HYPOTHYROIDISM: ICD-10-CM

## 2025-02-06 DIAGNOSIS — E66.01 MORBID (SEVERE) OBESITY DUE TO EXCESS CALORIES (MULTI): ICD-10-CM

## 2025-02-06 DIAGNOSIS — L21.9 SEBORRHEIC ECZEMA OF SCALP: Primary | ICD-10-CM

## 2025-02-06 PROCEDURE — G0439 PPPS, SUBSEQ VISIT: HCPCS | Performed by: FAMILY MEDICINE

## 2025-02-06 PROCEDURE — 99396 PREV VISIT EST AGE 40-64: CPT | Performed by: FAMILY MEDICINE

## 2025-02-06 RX ORDER — KETOCONAZOLE 20 MG/ML
SHAMPOO, SUSPENSION TOPICAL 2 TIMES WEEKLY
Qty: 120 ML | Refills: 11 | Status: SHIPPED | OUTPATIENT
Start: 2025-02-06

## 2025-02-06 RX ORDER — LEVOTHYROXINE SODIUM 75 UG/1
75 TABLET ORAL DAILY
Qty: 90 TABLET | Refills: 3 | Status: SHIPPED | OUTPATIENT
Start: 2025-02-06

## 2025-02-06 ASSESSMENT — ENCOUNTER SYMPTOMS
FATIGUE: 0
HEADACHES: 0
NUMBNESS: 0
VOMITING: 0
POLYPHAGIA: 0
DEPRESSION: 0
LOSS OF SENSATION IN FEET: 0
OCCASIONAL FEELINGS OF UNSTEADINESS: 0
DIZZINESS: 0
SHORTNESS OF BREATH: 0
WEAKNESS: 0
FREQUENCY: 0
CHEST TIGHTNESS: 0
DIARRHEA: 0
POLYDIPSIA: 0
APPETITE CHANGE: 0
ARTHRALGIAS: 0
NAUSEA: 0
NERVOUS/ANXIOUS: 1
MYALGIAS: 0

## 2025-02-06 ASSESSMENT — ACTIVITIES OF DAILY LIVING (ADL)
DRESSING: DEPENDENT
GROCERY_SHOPPING: TOTAL CARE
DOING_HOUSEWORK: TOTAL CARE
BATHING: DEPENDENT
MANAGING_FINANCES: TOTAL CARE
TAKING_MEDICATION: TOTAL CARE

## 2025-02-06 NOTE — PROGRESS NOTES
Subjective   Patient ID: Sandra Rousseau is a 44 y.o. female who presents for Medicare Annual Wellness Visit Subsequent (Medicare Wellness exam).    HPI     Review of Systems   Constitutional:  Negative for appetite change and fatigue.        Pt non verbal and mother is historian   Eyes:  Negative for visual disturbance.   Respiratory:  Negative for chest tightness and shortness of breath.    Cardiovascular:  Negative for chest pain and leg swelling.   Gastrointestinal:  Negative for diarrhea, nausea and vomiting.   Endocrine: Negative for polydipsia, polyphagia and polyuria.   Genitourinary:  Negative for frequency and urgency.   Musculoskeletal:  Negative for arthralgias and myalgias.   Neurological:  Negative for dizziness, syncope, weakness, numbness and headaches.   Psychiatric/Behavioral:  Positive for behavioral problems. The patient is nervous/anxious.        Objective   /72   Pulse 86   Wt 78.9 kg (174 lb)   BMI 36.37 kg/m²     Physical Exam  Constitutional:       Appearance: Normal appearance.   HENT:      Head: Normocephalic.      Right Ear: Tympanic membrane, ear canal and external ear normal.      Left Ear: Tympanic membrane, ear canal and external ear normal.      Nose: Nose normal.      Mouth/Throat:      Mouth: Mucous membranes are moist.      Pharynx: Oropharynx is clear.   Eyes:      Conjunctiva/sclera: Conjunctivae normal.   Cardiovascular:      Rate and Rhythm: Normal rate and regular rhythm.   Pulmonary:      Effort: Pulmonary effort is normal.      Breath sounds: Normal breath sounds.   Musculoskeletal:         General: Normal range of motion.      Cervical back: Neck supple.   Skin:     General: Skin is warm and dry.   Neurological:      General: No focal deficit present.      Mental Status: She is alert and oriented to person, place, and time.   Psychiatric:         Mood and Affect: Mood is anxious.         Speech: She is noncommunicative.      Comments: OCD symptoms          Assessment/Plan   Problem List Items Addressed This Visit             ICD-10-CM    Adult hypothyroidism E03.9    Relevant Medications    levothyroxine (Synthroid, Levoxyl) 75 mcg tablet    Other Relevant Orders    Follow Up In [facility] - PCP    Morbid (severe) obesity due to excess calories (Multi) E66.01     Reviewed discuss diet and exercise          Other Visit Diagnoses         Codes    Seborrheic eczema of scalp    -  Primary L21.9    Relevant Medications    ketoconazole (NIZOral) 2 % shampoo    Medicare annual wellness visit, subsequent     Z00.00

## 2025-02-16 ASSESSMENT — ENCOUNTER SYMPTOMS
ABDOMINAL PAIN: 0
SLEEP DISTURBANCE: 1
ARTHRALGIAS: 0
MYALGIAS: 0
ACTIVITY CHANGE: 0
TROUBLE SWALLOWING: 0
TREMORS: 0
DYSURIA: 0
CONSTIPATION: 0
CHOKING: 0
SEIZURES: 0
LIGHT-HEADEDNESS: 0
COUGH: 0
DIZZINESS: 0

## 2025-02-16 NOTE — PROGRESS NOTES
"Outpatient Adult IDD Psychiatry    A HIPAA-compliant interactive audio and video telecommunication system which permits real time communications between the patient (at the originating site) and provider (at the distant site) was utilized to provide this telehealth service.     The patient, family, caregivers and guardian (as appropriate) have provided consent to conduct treatment via this telehealth service.      The patient's identity and physical location were verified at the time of this visit.     Present for appointment: Sandra and mom/guardian (Ernestina).    Appointment location: Home.  [address] [place], [place] [address]jan[place] [address]    SUBJECTIVE    No new/acute health issues for Sandra since last visit.      Saw PCP for annual physical exam on 2/06/25.  Will re-check FT4 in July; no change to levothyroxine.    Holidays were \"not that great\" as dad was hospitalized twice around [place] for nephrolithiasis treatment.    She has not had any change in her obsessive or ritualized behaviors.    Generally seems content as long as she isn't required to leave the house, and as long as she can \"control\" the environment at home.    Most days mom is giving her 0.5mg of alprazolam twice daily; occasionally giving a third 0.5mg dose as needed.  Not needing the higher dose (0.75mg twice daily) since they are really not going out much or having a lot of family visiting right now.    No significant change to sleep patterns.  She does not nap during the day.    Weight/appetite are stable.  No apparent GI problems.    Minimal or infrequent episodes of daytime urinary incontinence reported by mom.    No concerns about cognitive decline/regression.    No witnessed/observed falls or seizures.    Review of Systems   Constitutional:  Negative for activity change.   HENT:  Negative for drooling and trouble swallowing.    Respiratory:  Negative for cough and choking.    Cardiovascular:  Negative for chest pain.   Gastrointestinal:  Negative for " abdominal pain and constipation.   Genitourinary:  Negative for dysuria and enuresis.   Musculoskeletal:  Negative for arthralgias, gait problem and myalgias.   Neurological:  Negative for dizziness, tremors, seizures, syncope and light-headedness.   Psychiatric/Behavioral:  Positive for behavioral problems and sleep disturbance. Negative for self-injury.       Controlled Substance Evaluation  OARRS/PDMP reviewed: Davon Robertson MD on 2/16/2025  9:43 AM  Is the patient prescribed a combination of a benzodiazepine and opioid? No  I have personally reviewed the OARRS report for Sandra Rousseau.   I have considered the risks of abuse, dependence, addiction and diversion.    I believe that it is clinically appropriate for Sandra Rousseau to be prescribed this medication.    Last Urine Drug Screen:  Recent Results (from the past 8760 hours)   Screen Opiate/Opioid/Benzo Prescription Compliance    Collection Time: 01/17/25 11:06 AM   Result Value Ref Range    Creatinine, Urine Random 96.3 20.0 - 320.0 mg/dL    Amphetamine Screen, Urine Presumptive Negative Presumptive Negative    Barbiturate Screen, Urine Presumptive Negative Presumptive Negative    Cannabinoid Screen, Urine Presumptive Negative Presumptive Negative    Cocaine Metabolite Screen, Urine Presumptive Negative Presumptive Negative    PCP Screen, Urine Presumptive Negative Presumptive Negative   Confirmation Opiate/Opioid/Benzo Prescription Compliance    Collection Time: 01/15/25  9:20 AM   Result Value Ref Range    Clonazepam <25 <25 ng/mL    7-Aminoclonazepam <25 <25 ng/mL    Alprazolam 199 (H) <25 ng/mL    Alpha-Hydroxyalprazolam 68 (H) <25 ng/mL    Midazolam <25 <25 ng/mL    Alpha-Hydroxymidazolam <25 <25 ng/mL    Chlordiazepoxide <25 <25 ng/mL    Diazepam <25 <25 ng/mL    Nordiazepam <25 <25 ng/mL    Temazepam <25 <25 ng/mL    Oxazepam <25 <25 ng/mL    Lorazepam <25 <25 ng/mL    Methadone <25 <25 ng/mL    EDDP <25 <25 ng/mL    6-Acetylmorphine <25  <25 ng/mL    Codeine <50 <50 ng/mL    Hydrocodone <25 <25 ng/mL    Hydromorphone <25 <25 ng/mL    Morphine  <50 <50 ng/mL    Norhydrocodone <25 <25 ng/mL    Noroxycodone <25 <25 ng/mL    Oxycodone <25 <25 ng/mL    Oxymorphone <25 <25 ng/mL    Fentanyl <2.5 <2.5 ng/mL    Norfentanyl <2.5 <2.5 ng/mL    Tramadol <50 <50 ng/mL    O-Desmethyltramadol <50 <50 ng/mL    Zolpidem <25 <25 ng/mL    Zolpidem Metabolite (ZCA) <25 <25 ng/mL     Controlled Substance Agreement: 11/18/2024  Reviewed Controlled Substance Agreement, including but not limited to:  Benefits, risks, and alternatives to treatment with a controlled substance medication(s).    Prescribed Controlled Substances:  > Benzodiazepines: Alprazolam  What is the patient's goal of therapy? Reduced anxiety/agitation  Is this being achieved with current treatment? Yes  Activities of Daily Living:   Is your overall impression that this patient is benefiting (symptom reduction outweighs side effects) from benzodiazepine therapy? Yes   1. Physical Functioning: Same  2. Family Relationship: Same  3. Social Relationship: Same  4. Mood: Same  5. Sleep Patterns: Same  6. Overall Function: Same     MEDICATION HISTORY  Aripiprazole - 40 pound weight gain  Buspirone - worse anxiety and OCD  Clomipramine - not helpful  Clonazepam - no better than alprazolam  Depakote - no benefit  Diazepam - no improvement  Duloxetine - no benefit  Fluoxetine - no obvious benefit  Naltrexone - decreased appetite but no change in OCD  Paroxetine - agitated and bizarre behaviors  Propranolol - Raynaud's flares  Risperidone - EPS  Sertraline - not helpful  Topiramate - did not tolerate, likely paresthesias    CURRENT MEDICATIONS    Current Outpatient Medications:     ALPRAZolam (Xanax) 1 mg tablet, Take 0.75mg (3/4 tablet) twice daily for anxiety; may take an additional 1 tablet once per day as needed for breakthrough anxiety symptoms or panic attacks., Disp: 60 tablet, Rfl: 2    cholecalciferol  (Vitamin D-3) 5,000 Units tablet, Take by mouth., Disp: , Rfl:     fLuvoxaMINE (Luvox) 100 mg tablet, Take 1 tablet (100 mg) by mouth 2 times a day., Disp: 180 tablet, Rfl: 3    hydroCHLOROthiazide (Microzide) 12.5 mg capsule, Take 1 capsule (12.5 mg) by mouth once daily., Disp: , Rfl:     hydrOXYzine HCL (Atarax) 10 mg tablet, Take 1 tablet (10 mg) by mouth 2 times a day as needed for anxiety., Disp: , Rfl:     ketoconazole (NIZOral) 2 % cream, Apply topically., Disp: , Rfl:     ketoconazole (NIZOral) 2 % shampoo, Apply topically 2 times a week., Disp: 120 mL, Rfl: 11    levothyroxine (Synthroid, Levoxyl) 75 mcg tablet, Take 1 tablet (75 mcg) by mouth once daily. as directed, Disp: 90 tablet, Rfl: 3    medroxyPROGESTERone 150 mg/mL injection, Inject 1 mL (150 mg) into the muscle every 3 months., Disp: 1 mL, Rfl: 3    melatonin 10 mg tablet, Take 1 tablet (10 mg) by mouth., Disp: , Rfl:     mupirocin (Bactroban) 2 % ointment, Apply topically., Disp: , Rfl:     nystatin (Mycostatin) 100,000 unit/gram powder, Apply topically as needed at bedtime for rash., Disp: 60 g, Rfl: 2    omeprazole (PriLOSEC) 20 mg DR capsule, TAKE 1 CAPSULE BY MOUTH EVERY DAY, Disp: 90 capsule, Rfl: 3    PROGESTERONE INJECTION IN ETHYL OLEATE 50 MG/ML, , Disp: , Rfl:     SOCIAL HISTORY  Living situation Lives with parents   Provider agency None   Work or day program None   School N/A   Guardianship Mother (Ernestina)   SSA ?   Bx Specialist ?   Nicotine None   Alcohol None   Other drugs None     OBJECTIVE    Lab Results   Component Value Date    HGB 12.5 01/15/2025     01/15/2025    NEUTROABS 4.72 01/15/2025    GLUCOSE 74 01/15/2025     01/15/2025    K 3.5 01/15/2025    CO2 26 01/15/2025    CALCIUM 9.4 01/15/2025    CREATININE 0.90 01/15/2025    AST 13 01/15/2025    ALT 14 01/15/2025    HGBA1C 5.1 01/15/2025    TSH 1.84 01/15/2025    FREET4 1.51 (H) 01/15/2025    CHOL 185 01/15/2025    LDLF 116 (H) 01/18/2023    TRIG 62 01/15/2025     VITD25 63 12/30/2019     Therapeutic Drug Monitoring  N/A    Electrocardiograms  None in chart    MENTAL STATUS EXAM  General/Appearance: Appropriate dress/hygiene/grooming.  Attitude/Behavior: Minimally engaged. Non-specific eye contact.  Speech/Communication: Apraxic. Single word responses.  Motor: Fidgets. Restless.  Gait: Not assessed at this visit.  Mood: Neutral.  Affect: Neutral.  Thought processes: [place].  Thought content: Unable to assess.  Perception: Does not appear to be experiencing or responding to hallucinations.  Sensorium/Cognition: Oriented to self and surroundings. Intellectual impairment. Minimal interest in participating.  Memory: Not directly assessed at this time.  Insight: Absent.  Judgment: Redirectable.    ASSESSMENT  No significant changes to anxiety or OCD, better or worse; symptoms are still very functionally impairing and challenging for parents to manage.  Seems to be stable on current treatment regimen, and has not fared much better with previous attempts at treatment optimization or changes.  Will change next script for alprazolam back to the 0.5mg tablets to reflect current usage.    PROBLEM LIST  Intellectual developmental disorder, moderate  OCD  TORY  Agoraphobia    PLAN  -- Continue fluvoxamine 100mg BID for anxiety and OCD  -- Continue alprazolam 0.5mg twice daily for anxiety and OCD  -- Continue melatonin 10mg QHS for sleep  -- OTC CBD capsules 15mg once daily for anxiety  -- PRN: hydroxyzine 10mg to 20mg daily for anxiety  -- PRN: alprazolam 0.5 once daily for severe anxiety  -- Follow up 3 months    Davon Robertson MD

## 2025-02-17 ENCOUNTER — APPOINTMENT (OUTPATIENT)
Dept: BEHAVIORAL HEALTH | Facility: CLINIC | Age: 45
End: 2025-02-17
Payer: MEDICARE

## 2025-02-17 DIAGNOSIS — F71 MODERATE INTELLECTUAL DISABILITIES: ICD-10-CM

## 2025-02-17 DIAGNOSIS — F41.1 GENERALIZED ANXIETY DISORDER: ICD-10-CM

## 2025-02-17 DIAGNOSIS — F42.2 MIXED OBSESSIONAL THOUGHTS AND ACTS: Primary | ICD-10-CM

## 2025-02-17 DIAGNOSIS — F40.02 AGORAPHOBIA WITHOUT PANIC DISORDER: ICD-10-CM

## 2025-02-17 PROCEDURE — G2211 COMPLEX E/M VISIT ADD ON: HCPCS | Performed by: PSYCHIATRY & NEUROLOGY

## 2025-02-17 PROCEDURE — 1036F TOBACCO NON-USER: CPT | Performed by: PSYCHIATRY & NEUROLOGY

## 2025-02-17 PROCEDURE — 99214 OFFICE O/P EST MOD 30 MIN: CPT | Performed by: PSYCHIATRY & NEUROLOGY

## 2025-02-17 RX ORDER — ALPRAZOLAM 0.5 MG/1
TABLET ORAL
Qty: 90 TABLET | Refills: 1 | Status: SHIPPED | OUTPATIENT
Start: 2025-02-17

## 2025-05-01 DIAGNOSIS — Z00.00 WELL ADULT EXAM: ICD-10-CM

## 2025-05-01 RX ORDER — MEDROXYPROGESTERONE ACETATE 150 MG/ML
150 INJECTION, SUSPENSION INTRAMUSCULAR
Qty: 1 ML | Refills: 3 | Status: SHIPPED | OUTPATIENT
Start: 2025-05-01

## 2025-05-02 ENCOUNTER — APPOINTMENT (OUTPATIENT)
Dept: OBSTETRICS AND GYNECOLOGY | Facility: CLINIC | Age: 45
End: 2025-05-02
Payer: MEDICARE

## 2025-05-02 VITALS
DIASTOLIC BLOOD PRESSURE: 73 MMHG | HEIGHT: 58 IN | WEIGHT: 172 LBS | SYSTOLIC BLOOD PRESSURE: 119 MMHG | HEART RATE: 77 BPM | OXYGEN SATURATION: 97 % | BODY MASS INDEX: 36.11 KG/M2

## 2025-05-02 DIAGNOSIS — Z30.49 ENCOUNTER FOR SURVEILLANCE OF OTHER CONTRACEPTIVE: Primary | ICD-10-CM

## 2025-05-02 DIAGNOSIS — Z12.31 VISIT FOR SCREENING MAMMOGRAM: ICD-10-CM

## 2025-05-02 DIAGNOSIS — R21 RASH: ICD-10-CM

## 2025-05-02 PROCEDURE — 99213 OFFICE O/P EST LOW 20 MIN: CPT | Performed by: OBSTETRICS & GYNECOLOGY

## 2025-05-02 PROCEDURE — 3008F BODY MASS INDEX DOCD: CPT | Performed by: OBSTETRICS & GYNECOLOGY

## 2025-05-02 PROCEDURE — 1036F TOBACCO NON-USER: CPT | Performed by: OBSTETRICS & GYNECOLOGY

## 2025-05-02 RX ORDER — NYSTATIN 100000 [USP'U]/G
POWDER TOPICAL NIGHTLY PRN
Qty: 60 G | Refills: 2 | Status: SHIPPED | OUTPATIENT
Start: 2025-05-02

## 2025-05-02 ASSESSMENT — PATIENT HEALTH QUESTIONNAIRE - PHQ9
2. FEELING DOWN, DEPRESSED OR HOPELESS: NOT AT ALL
SUM OF ALL RESPONSES TO PHQ9 QUESTIONS 1 & 2: 0
1. LITTLE INTEREST OR PLEASURE IN DOING THINGS: NOT AT ALL

## 2025-05-02 NOTE — PROGRESS NOTES
"Patient presents for an annual exam  Last PAP 2023 NEG HPV-  Last Mammogram 2024 NEG  On Depo , had last injection last week     BRAD Alcantara  Patient presents for an annual exam   Last PAP 2023 NEG HPV-  Last Mammogram 2023 NEG    GYN ANNUAL EXAM    SUBJECTIVE    Sandra Rousseau is a 44 y.o. female who presents for annual exam today.  Her periods are absent.  She is using DepoProvera to achieve amenorrhea.        She is here today with her mother who is her caretaker as she is non-verbal.     PMH - agoraphobia, hypothyroidism, OCD     PSH - none     OB history - G0    Last pap -   Normal HPV Negative-  - normal, negative HPV     Last mammogram - 2024 - BIRADs 1     Family history of breast or ovarian cancer - None     OBJECTIVE  /73 (BP Location: Left arm, Patient Position: Sitting, BP Cuff Size: Large adult)   Pulse 77   Ht 1.473 m (4' 10\")   Wt 78 kg (172 lb)   LMP  (LMP Unknown)   SpO2 97%   BMI 35.95 kg/m²     General Appearance   - consistent with stated age, well groomed and cooperative    Integumentary  - skin warm and dry without rash    Head and Neck  - normalocephalic and neck supple    Chest and Lung Exam  - normal breathing effort, no respiratory distress    Breast  - symmetry noted, no mass palpable, no skin change and no nipple discharge.    Abdomen  - soft, nontender and no hepatomegaly, splenomegaly, or mass    Female Genitourinary  - Declines pelvic exam unless needed for cervical cancer screening     Peripheral Vascular  - no edema present    ASSESSMENT/PLAN  44 y.o.  female who presents for annual exam.       Actions performed during this visit include:  - Clinical breast exam  - Pap- UTD and not indicated today   - Mammogram- Screening mammogram ordered.   - Contraception - Not sexually active and has never been sexually active but uses DepoProvera for menstrual suppression.  Continue this for now - will likely stop around age 49.     Please " return for your next visit in 1 year or sooner as needed.     Mikayla Prakash MD

## 2025-05-23 ENCOUNTER — APPOINTMENT (OUTPATIENT)
Dept: BEHAVIORAL HEALTH | Facility: CLINIC | Age: 45
End: 2025-05-23
Payer: MEDICARE

## 2025-05-23 DIAGNOSIS — F41.1 GENERALIZED ANXIETY DISORDER: ICD-10-CM

## 2025-05-23 DIAGNOSIS — F40.02 AGORAPHOBIA WITHOUT PANIC DISORDER: ICD-10-CM

## 2025-05-23 DIAGNOSIS — F71 MODERATE INTELLECTUAL DISABILITIES: ICD-10-CM

## 2025-05-23 DIAGNOSIS — F42.2 MIXED OBSESSIONAL THOUGHTS AND ACTS: Primary | ICD-10-CM

## 2025-05-23 PROCEDURE — 99214 OFFICE O/P EST MOD 30 MIN: CPT | Performed by: PSYCHIATRY & NEUROLOGY

## 2025-05-23 PROCEDURE — G2211 COMPLEX E/M VISIT ADD ON: HCPCS | Performed by: PSYCHIATRY & NEUROLOGY

## 2025-05-23 RX ORDER — ALPRAZOLAM 0.5 MG/1
TABLET ORAL
Qty: 90 TABLET | Refills: 2 | Status: SHIPPED | OUTPATIENT
Start: 2025-05-23

## 2025-05-23 RX ORDER — FLUVOXAMINE MALEATE 100 MG/1
100 TABLET, COATED ORAL 2 TIMES DAILY
Qty: 180 TABLET | Refills: 3 | Status: SHIPPED | OUTPATIENT
Start: 2025-05-23

## 2025-05-23 ASSESSMENT — ENCOUNTER SYMPTOMS
DIZZINESS: 0
CHOKING: 0
LIGHT-HEADEDNESS: 0
DYSURIA: 0
CONSTIPATION: 0
ABDOMINAL PAIN: 0
TREMORS: 0
SEIZURES: 0
SLEEP DISTURBANCE: 1
ACTIVITY CHANGE: 0
ARTHRALGIAS: 0
COUGH: 0
TROUBLE SWALLOWING: 0
MYALGIAS: 0

## 2025-05-23 NOTE — PROGRESS NOTES
"Outpatient Adult IDD Psychiatry    A HIPAA-compliant interactive audio and video telecommunication system which permits real time communications between the patient (at the originating site) and provider (at the distant site) was utilized to provide this telehealth service.     The patient, family, caregivers and guardian (as appropriate) have provided consent to conduct treatment via this telehealth service.      The patient's identity and physical location were verified at the time of this visit.     Present for appointment: Sandra and mom/guardian (Ernestina).    Appointment location: Home.  [place], [place]    SUBJECTIVE    No new/acute health issues for Sandra since last visit.      Mom notes that Sandra's OCD symptoms have been somewhat increased over the past month.  She has been needing to receive the extra as-needed mid-day dose of alprazolam on a more regular basis, which does seem to help reduce some of the anxiety and restlessness.    She still remains very controlling at home (eg, often will get up and shut the TV off when parents are watching it if she doesn't want to have it on at the time).    Of note, Sandra's sister was recently in the hospital and needed fairly significant emergency surgery, which Sandra undoubtedly was aware of due to parents' frequent trips to the hospital.    She seems a little less interested in typical things she usually enjoys (games, puzzles, etc), and often prefers to spend most of her time on the couch on her tablet.    She had a few episodes of saying she was \"sick\" but without any other objective or observable symptoms or physical findings.  Mom suspects that she might have been saying this so that she could be allowed to eat her meals on the couch instead of having to get up and come to the table with her parents.    No significant change to sleep patterns.  She does not nap during the day.    Weight/appetite are stable.  No apparent GI problems, though she does occasionally have some loose " stools.    Minimal or infrequent episodes of daytime urinary incontinence reported by mom.    No concerns about cognitive decline/regression.    No witnessed/observed falls or seizures.    Review of Systems   Constitutional:  Negative for activity change.   HENT:  Negative for drooling and trouble swallowing.    Respiratory:  Negative for cough and choking.    Cardiovascular:  Negative for chest pain.   Gastrointestinal:  Negative for abdominal pain and constipation.   Genitourinary:  Negative for dysuria and enuresis.   Musculoskeletal:  Negative for arthralgias, gait problem and myalgias.   Neurological:  Negative for dizziness, tremors, seizures, syncope and light-headedness.   Psychiatric/Behavioral:  Positive for behavioral problems and sleep disturbance. Negative for self-injury.       Controlled Substance Evaluation  OARRS/PDMP reviewed: Davon Robertson MD on 5/23/2025  7:07 AM  Is the patient prescribed a combination of a benzodiazepine and opioid? No  I have personally reviewed the OARRS report for Sandra Rousseau.   I have considered the risks of abuse, dependence, addiction and diversion.    I believe that it is clinically appropriate for Sandra Rousseau to be prescribed this medication.    Last Urine Drug Screen:  Recent Results (from the past 8760 hours)   Screen Opiate/Opioid/Benzo Prescription Compliance    Collection Time: 01/17/25 11:06 AM   Result Value Ref Range    Creatinine, Urine Random 96.3 20.0 - 320.0 mg/dL    Amphetamine Screen, Urine Presumptive Negative Presumptive Negative    Barbiturate Screen, Urine Presumptive Negative Presumptive Negative    Cannabinoid Screen, Urine Presumptive Negative Presumptive Negative    Cocaine Metabolite Screen, Urine Presumptive Negative Presumptive Negative    PCP Screen, Urine Presumptive Negative Presumptive Negative   Confirmation Opiate/Opioid/Benzo Prescription Compliance    Collection Time: 01/15/25  9:20 AM   Result Value Ref Range     Clonazepam <25 <25 ng/mL    7-Aminoclonazepam <25 <25 ng/mL    Alprazolam 199 (H) <25 ng/mL    Alpha-Hydroxyalprazolam 68 (H) <25 ng/mL    Midazolam <25 <25 ng/mL    Alpha-Hydroxymidazolam <25 <25 ng/mL    Chlordiazepoxide <25 <25 ng/mL    Diazepam <25 <25 ng/mL    Nordiazepam <25 <25 ng/mL    Temazepam <25 <25 ng/mL    Oxazepam <25 <25 ng/mL    Lorazepam <25 <25 ng/mL    Methadone <25 <25 ng/mL    EDDP <25 <25 ng/mL    6-Acetylmorphine <25 <25 ng/mL    Codeine <50 <50 ng/mL    Hydrocodone <25 <25 ng/mL    Hydromorphone <25 <25 ng/mL    Morphine  <50 <50 ng/mL    Norhydrocodone <25 <25 ng/mL    Noroxycodone <25 <25 ng/mL    Oxycodone <25 <25 ng/mL    Oxymorphone <25 <25 ng/mL    Fentanyl <2.5 <2.5 ng/mL    Norfentanyl <2.5 <2.5 ng/mL    Tramadol <50 <50 ng/mL    O-Desmethyltramadol <50 <50 ng/mL    Zolpidem <25 <25 ng/mL    Zolpidem Metabolite (ZCA) <25 <25 ng/mL     Controlled Substance Agreement: 11/18/2024  Reviewed Controlled Substance Agreement, including but not limited to:  Benefits, risks, and alternatives to treatment with a controlled substance medication(s).    Prescribed Controlled Substances:  > Benzodiazepines: Alprazolam  What is the patient's goal of therapy? Reduced anxiety/agitation  Is this being achieved with current treatment? Yes  Activities of Daily Living:   Is your overall impression that this patient is benefiting (symptom reduction outweighs side effects) from benzodiazepine therapy? Yes   1. Physical Functioning: Same  2. Family Relationship: Same  3. Social Relationship: Same  4. Mood: Same  5. Sleep Patterns: Same  6. Overall Function: Same     MEDICATION HISTORY  Aripiprazole - 40 pound weight gain  Buspirone - worse anxiety and OCD  Clomipramine - not helpful  Clonazepam - no better than alprazolam  Depakote - no benefit  Diazepam - no improvement  Duloxetine - no benefit  Fluoxetine - no obvious benefit  Naltrexone - decreased appetite but no change in OCD  Paroxetine - agitated and  bizarre behaviors  Propranolol - Raynaud's flares  Risperidone - EPS  Sertraline - not helpful  Topiramate - did not tolerate, likely paresthesias    CURRENT MEDICATIONS  Medications Prior to Visit[1]    SOCIAL HISTORY  Living situation Lives with parents   Provider agency None   Work or day program None   School N/A   Guardianship Mother (Ernestina)   SSA ?   Bx Specialist ?   Nicotine None   Alcohol None   Other drugs None     OBJECTIVE    Lab Results   Component Value Date    HGB 12.5 01/15/2025     01/15/2025    NEUTROABS 4.72 01/15/2025    GLUCOSE 74 01/15/2025     01/15/2025    K 3.5 01/15/2025    CO2 26 01/15/2025    CALCIUM 9.4 01/15/2025    CREATININE 0.90 01/15/2025    AST 13 01/15/2025    ALT 14 01/15/2025    HGBA1C 5.1 01/15/2025    TSH 1.84 01/15/2025    FREET4 1.51 (H) 01/15/2025    CHOL 185 01/15/2025    LDLF 116 (H) 01/18/2023    TRIG 62 01/15/2025    VITD25 63 12/30/2019     Therapeutic Drug Monitoring  N/A    Electrocardiograms  None in chart    MENTAL STATUS EXAM  General/Appearance: Appropriate dress/hygiene/grooming.  Attitude/Behavior: Minimally engaged. Non-specific eye contact.  Speech/Communication: Apraxic. Single word responses.  Motor: Fidgets. Restless.  Gait: Not assessed at this visit.  Mood: Neutral.  Affect: Neutral.  Thought processes: [place].  Thought content: Unable to assess.  Perception: Does not appear to be experiencing or responding to hallucinations.  Sensorium/Cognition: Oriented to self and surroundings. Intellectual impairment. Minimal interest in participating.  Memory: Not directly assessed at this time.  Insight: Minimal. Smiles when mom talks about Sandra turning off the TV.  Judgment: Redirectable.    ASSESSMENT  Some increase in anxiety and OCD lately, though this may have been related to other situational or environmental stress or disruptions.  I am not opposed to mom's tentative plan to try increasing the daily CBD supplement (or adding a second daily  dose) to see if this help address any anxiety or OCD.    PROBLEM LIST  Intellectual developmental disorder, moderate  OCD  TORY  Agoraphobia    PLAN  -- Continue fluvoxamine 100mg BID for anxiety and OCD  -- Continue alprazolam 0.5mg twice daily for anxiety and OCD  -- OTC melatonin 10mg at bedtime  -- OTC CBD capsules   -- PRN: hydroxyzine 10mg to 20mg daily for anxiety  -- PRN: alprazolam 0.5 once daily for severe anxiety  -- Follow up 3 months in-office at [place] [id number]    Davon Robertson MD    Prep time on date of the patient encounter: 5 minutes   Time spent directly with patient/family/caregiver: 25 minutes   Additional time spent on patient care activities: 0 minutes   Documentation time: 5 minutes   Other time spent: 0 minutes   Total time on date of patient encounter: 35 minutes          [1]   Outpatient Medications Prior to Visit   Medication Sig Dispense Refill    ALPRAZolam (Xanax) 0.5 mg tablet Take 1 tablet (0.5 mg) by mouth twice daily. May take 1 additional tablet (0.5 mg) by mouth one time daily as needed for severe anxiety. 90 tablet 1    cholecalciferol (Vitamin D-3) 5,000 Units tablet Take by mouth.      fLuvoxaMINE (Luvox) 100 mg tablet Take 1 tablet (100 mg) by mouth 2 times a day. 180 tablet 3    hydroCHLOROthiazide (Microzide) 12.5 mg capsule Take 1 capsule (12.5 mg) by mouth once daily.      hydrOXYzine HCL (Atarax) 10 mg tablet Take 1 tablet (10 mg) by mouth 2 times a day as needed for anxiety.      ketoconazole (NIZOral) 2 % cream Apply topically.      ketoconazole (NIZOral) 2 % shampoo Apply topically 2 times a week. 120 mL 11    levothyroxine (Synthroid, Levoxyl) 75 mcg tablet Take 1 tablet (75 mcg) by mouth once daily. as directed 90 tablet 3    medroxyPROGESTERone 150 mg/mL injection INJECT 1 ML (150 MG) INTO THE MUSCLE EVERY 3 MONTHS. 1 mL 3    melatonin 10 mg tablet Take 1 tablet (10 mg) by mouth.      mupirocin (Bactroban) 2 % ointment Apply topically.      nystatin (Mycostatin)  100,000 unit/gram powder Apply topically as needed at bedtime for rash. 60 g 2    omeprazole (PriLOSEC) 20 mg DR capsule TAKE 1 CAPSULE BY MOUTH EVERY DAY 90 capsule 3    PROGESTERONE INJECTION IN ETHYL OLEATE 50 MG/ML        No facility-administered medications prior to visit.

## 2025-05-29 ENCOUNTER — HOSPITAL ENCOUNTER (OUTPATIENT)
Dept: RADIOLOGY | Facility: CLINIC | Age: 45
Discharge: HOME | End: 2025-05-29
Payer: MEDICARE

## 2025-05-29 VITALS — BODY MASS INDEX: 36.1 KG/M2 | WEIGHT: 171.96 LBS | HEIGHT: 58 IN

## 2025-05-29 DIAGNOSIS — Z12.31 VISIT FOR SCREENING MAMMOGRAM: ICD-10-CM

## 2025-05-29 PROCEDURE — 77067 SCR MAMMO BI INCL CAD: CPT | Performed by: RADIOLOGY

## 2025-05-29 PROCEDURE — 77063 BREAST TOMOSYNTHESIS BI: CPT | Performed by: RADIOLOGY

## 2025-05-29 PROCEDURE — 77067 SCR MAMMO BI INCL CAD: CPT

## 2025-06-18 ENCOUNTER — OFFICE VISIT (OUTPATIENT)
Dept: PRIMARY CARE | Facility: CLINIC | Age: 45
End: 2025-06-18
Payer: MEDICARE

## 2025-06-18 ENCOUNTER — HOSPITAL ENCOUNTER (OUTPATIENT)
Dept: RADIOLOGY | Facility: HOSPITAL | Age: 45
Discharge: HOME | End: 2025-06-18
Payer: MEDICARE

## 2025-06-18 VITALS
WEIGHT: 168.9 LBS | HEIGHT: 69 IN | RESPIRATION RATE: 17 BRPM | OXYGEN SATURATION: 98 % | BODY MASS INDEX: 25.02 KG/M2 | TEMPERATURE: 96 F | HEART RATE: 74 BPM | SYSTOLIC BLOOD PRESSURE: 112 MMHG | DIASTOLIC BLOOD PRESSURE: 68 MMHG

## 2025-06-18 DIAGNOSIS — R63.39 FOOD AVERSION: ICD-10-CM

## 2025-06-18 DIAGNOSIS — R10.84 GENERALIZED ABDOMINAL PAIN: ICD-10-CM

## 2025-06-18 DIAGNOSIS — K59.00 CONSTIPATION, UNSPECIFIED CONSTIPATION TYPE: ICD-10-CM

## 2025-06-18 DIAGNOSIS — R63.0 DECREASED APPETITE: ICD-10-CM

## 2025-06-18 DIAGNOSIS — R63.4 WEIGHT LOSS: Primary | ICD-10-CM

## 2025-06-18 DIAGNOSIS — R63.4 WEIGHT LOSS: ICD-10-CM

## 2025-06-18 DIAGNOSIS — K31.1 PYLORIC STENOSIS (HHS-HCC): ICD-10-CM

## 2025-06-18 DIAGNOSIS — R63.0 ANOREXIA: Primary | ICD-10-CM

## 2025-06-18 DIAGNOSIS — R63.0 LOSS OF APPETITE: ICD-10-CM

## 2025-06-18 PROCEDURE — 74176 CT ABD & PELVIS W/O CONTRAST: CPT

## 2025-06-18 PROCEDURE — 99214 OFFICE O/P EST MOD 30 MIN: CPT | Performed by: FAMILY MEDICINE

## 2025-06-18 ASSESSMENT — ENCOUNTER SYMPTOMS
DIZZINESS: 0
MYALGIAS: 0
APPETITE CHANGE: 0
CONSTIPATION: 1
ABDOMINAL PAIN: 1
NAUSEA: 1
CHEST TIGHTNESS: 0
DIARRHEA: 0
POLYDIPSIA: 0
ARTHRALGIAS: 0
ABDOMINAL DISTENTION: 1
FATIGUE: 0
VOMITING: 0
FREQUENCY: 0
NUMBNESS: 0
HEADACHES: 0
SHORTNESS OF BREATH: 0
POLYPHAGIA: 0
WEAKNESS: 0

## 2025-06-18 NOTE — PROGRESS NOTES
"Subjective   Patient ID: Sandra Rousseau is a 44 y.o. female who presents for Weight Loss (Mom states Sandra has been losing weight in the past 2-3 weeks and not eating. She states she has been fatigue and not wanting to do anything. She also states she feels sick to her stomach when food is offered. She has also been very sweaty.  She has been constipated and did use dulcolax and that did help. Mom states she did try to schedule with GI but was unable to schedule.    ).    HPI     Review of Systems   Constitutional:  Negative for appetite change and fatigue.   Eyes:  Negative for visual disturbance.   Respiratory:  Negative for chest tightness and shortness of breath.    Cardiovascular:  Negative for chest pain and leg swelling.   Gastrointestinal:  Positive for abdominal distention, abdominal pain, constipation and nausea. Negative for diarrhea and vomiting.        Pt refusing food. Pt had similar symptoms with previous pyloric stenosis and needed a dilation   Endocrine: Negative for polydipsia, polyphagia and polyuria.   Genitourinary:  Negative for frequency and urgency.   Musculoskeletal:  Negative for arthralgias and myalgias.   Neurological:  Negative for dizziness, syncope, weakness, numbness and headaches.       Objective   /68   Pulse 74   Temp 35.6 °C (96 °F)   Resp 17   Ht 1.753 m (5' 9\")   Wt 76.6 kg (168 lb 14.4 oz)   SpO2 98%   BMI 24.94 kg/m²     Physical Exam  Constitutional:       Appearance: Normal appearance.   HENT:      Head: Normocephalic.   Pulmonary:      Effort: Pulmonary effort is normal.   Abdominal:      Tenderness: There is generalized abdominal tenderness. There is no right CVA tenderness or left CVA tenderness. Negative signs include Ibarra's sign, McBurney's sign and psoas sign.   Musculoskeletal:      Cervical back: Neck supple.   Skin:     General: Skin is warm and dry.   Psychiatric:         Mood and Affect: Mood normal.         Assessment/Plan   Problem List Items " Addressed This Visit    None  Visit Diagnoses         Codes      Weight loss    -  Primary R63.4    Relevant Orders    CBC and Auto Differential    Comprehensive Metabolic Panel    Sedimentation Rate    Thyroid Stimulating Hormone    T4, free    Amylase    Lipase    CT abdomen pelvis wo IV contrast    Referral to Gastroenterology      Constipation, unspecified constipation type     K59.00    Relevant Orders    CBC and Auto Differential    Comprehensive Metabolic Panel    Sedimentation Rate    Thyroid Stimulating Hormone    T4, free    Amylase    Lipase    CT abdomen pelvis wo IV contrast    Referral to Gastroenterology      Generalized abdominal pain     R10.84    Relevant Orders    CBC and Auto Differential    Comprehensive Metabolic Panel    Sedimentation Rate    Thyroid Stimulating Hormone    T4, free    Amylase    Lipase    CT abdomen pelvis wo IV contrast    Referral to Gastroenterology      Decreased appetite     R63.0    Relevant Orders    CBC and Auto Differential    Comprehensive Metabolic Panel    Sedimentation Rate    Thyroid Stimulating Hormone    T4, free    Amylase    Lipase    CT abdomen pelvis wo IV contrast    Referral to Gastroenterology          Pt to use miralax anc colace for constipation.  Pt has had a 9lb weight loss in 2 weeks per mother

## 2025-06-18 NOTE — H&P (VIEW-ONLY)
Contacted by PCP regarding patient who is nonverbal and refusing food x2 weeks. Per PCP, she has a history of pyloric stenosis with dilation. Will arrange urgent EGD.

## 2025-06-19 ENCOUNTER — TELEPHONE (OUTPATIENT)
Dept: PRIMARY CARE | Facility: CLINIC | Age: 45
End: 2025-06-19
Payer: MEDICARE

## 2025-06-19 LAB
ALBUMIN SERPL-MCNC: 4.3 G/DL (ref 3.6–5.1)
ALP SERPL-CCNC: 51 U/L (ref 31–125)
ALT SERPL-CCNC: 12 U/L (ref 6–29)
AMYLASE SERPL-CCNC: 37 U/L (ref 21–101)
ANION GAP SERPL CALCULATED.4IONS-SCNC: 13 MMOL/L (CALC) (ref 7–17)
AST SERPL-CCNC: 14 U/L (ref 10–35)
BASOPHILS # BLD AUTO: 8 CELLS/UL (ref 0–200)
BASOPHILS NFR BLD AUTO: 0.1 %
BILIRUB SERPL-MCNC: 0.4 MG/DL (ref 0.2–1.2)
BUN SERPL-MCNC: 18 MG/DL (ref 7–25)
CALCIUM SERPL-MCNC: 9.4 MG/DL (ref 8.6–10.2)
CHLORIDE SERPL-SCNC: 104 MMOL/L (ref 98–110)
CO2 SERPL-SCNC: 23 MMOL/L (ref 20–32)
CREAT SERPL-MCNC: 0.81 MG/DL (ref 0.5–0.99)
EGFRCR SERPLBLD CKD-EPI 2021: 91 ML/MIN/1.73M2
EOSINOPHIL # BLD AUTO: 123 CELLS/UL (ref 15–500)
EOSINOPHIL NFR BLD AUTO: 1.5 %
ERYTHROCYTE [DISTWIDTH] IN BLOOD BY AUTOMATED COUNT: 16.1 % (ref 11–15)
ERYTHROCYTE [SEDIMENTATION RATE] IN BLOOD BY WESTERGREN METHOD: 17 MM/H
GLUCOSE SERPL-MCNC: 88 MG/DL (ref 65–99)
HCT VFR BLD AUTO: 44.3 % (ref 35–45)
HGB BLD-MCNC: 13.6 G/DL (ref 11.7–15.5)
LIPASE SERPL-CCNC: 20 U/L (ref 7–60)
LYMPHOCYTES # BLD AUTO: 2353 CELLS/UL (ref 850–3900)
LYMPHOCYTES NFR BLD AUTO: 28.7 %
MCH RBC QN AUTO: 24.5 PG (ref 27–33)
MCHC RBC AUTO-ENTMCNC: 30.7 G/DL (ref 32–36)
MCV RBC AUTO: 79.7 FL (ref 80–100)
MONOCYTES # BLD AUTO: 484 CELLS/UL (ref 200–950)
MONOCYTES NFR BLD AUTO: 5.9 %
NEUTROPHILS # BLD AUTO: 5232 CELLS/UL (ref 1500–7800)
NEUTROPHILS NFR BLD AUTO: 63.8 %
PLATELET # BLD AUTO: 186 THOUSAND/UL (ref 140–400)
PMV BLD REES-ECKER: ABNORMAL FL
POTASSIUM SERPL-SCNC: 3.9 MMOL/L (ref 3.5–5.3)
PROT SERPL-MCNC: 7.3 G/DL (ref 6.1–8.1)
RBC # BLD AUTO: 5.56 MILLION/UL (ref 3.8–5.1)
SODIUM SERPL-SCNC: 140 MMOL/L (ref 135–146)
T4 FREE SERPL-MCNC: 1.6 NG/DL (ref 0.8–1.8)
TSH SERPL-ACNC: 0.32 MIU/L
WBC # BLD AUTO: 8.2 THOUSAND/UL (ref 3.8–10.8)

## 2025-06-19 NOTE — TELEPHONE ENCOUNTER
----- Message from Davon Carvajal sent at 6/19/2025  7:29 AM EDT -----  Call Sandra Rousseau Their labs were normal her labs looked OK  ----- Message -----  From: [facility] [employer] Results In  Sent: 6/18/2025   8:51 PM EDT  To: Davon Carvaajl, DO

## 2025-06-25 ENCOUNTER — ANESTHESIA (OUTPATIENT)
Dept: GASTROENTEROLOGY | Facility: HOSPITAL | Age: 45
End: 2025-06-25
Payer: MEDICARE

## 2025-06-25 ENCOUNTER — ANESTHESIA EVENT (OUTPATIENT)
Dept: GASTROENTEROLOGY | Facility: HOSPITAL | Age: 45
End: 2025-06-25
Payer: MEDICARE

## 2025-06-25 ENCOUNTER — HOSPITAL ENCOUNTER (OUTPATIENT)
Dept: GASTROENTEROLOGY | Facility: HOSPITAL | Age: 45
Discharge: HOME | End: 2025-06-25
Payer: MEDICARE

## 2025-06-25 VITALS
RESPIRATION RATE: 18 BRPM | HEART RATE: 101 BPM | HEIGHT: 58 IN | OXYGEN SATURATION: 100 % | TEMPERATURE: 98.8 F | WEIGHT: 162 LBS | DIASTOLIC BLOOD PRESSURE: 74 MMHG | BODY MASS INDEX: 34 KG/M2 | SYSTOLIC BLOOD PRESSURE: 145 MMHG

## 2025-06-25 DIAGNOSIS — K31.1 PYLORIC STENOSIS (HHS-HCC): ICD-10-CM

## 2025-06-25 DIAGNOSIS — R63.0 LOSS OF APPETITE: ICD-10-CM

## 2025-06-25 DIAGNOSIS — K31.7 GASTRIC POLYP: ICD-10-CM

## 2025-06-25 DIAGNOSIS — R63.0 ANOREXIA: Primary | ICD-10-CM

## 2025-06-25 DIAGNOSIS — R63.39 FOOD AVERSION: ICD-10-CM

## 2025-06-25 PROCEDURE — 43239 EGD BIOPSY SINGLE/MULTIPLE: CPT | Performed by: INTERNAL MEDICINE

## 2025-06-25 PROCEDURE — 43254 EGD ENDO MUCOSAL RESECTION: CPT | Performed by: INTERNAL MEDICINE

## 2025-06-25 PROCEDURE — 2720000007 HC OR 272 NO HCPCS

## 2025-06-25 PROCEDURE — 7100000009 HC PHASE TWO TIME - INITIAL BASE CHARGE

## 2025-06-25 PROCEDURE — A43254 PR EDG TRANSORAL ENDOSCOPIC MUCOSAL RESECTION: Performed by: NURSE ANESTHETIST, CERTIFIED REGISTERED

## 2025-06-25 PROCEDURE — 3700000002 HC GENERAL ANESTHESIA TIME - EACH INCREMENTAL 1 MINUTE

## 2025-06-25 PROCEDURE — 7100000010 HC PHASE TWO TIME - EACH INCREMENTAL 1 MINUTE

## 2025-06-25 PROCEDURE — A43254 PR EDG TRANSORAL ENDOSCOPIC MUCOSAL RESECTION: Performed by: ANESTHESIOLOGY

## 2025-06-25 PROCEDURE — 3700000001 HC GENERAL ANESTHESIA TIME - INITIAL BASE CHARGE

## 2025-06-25 PROCEDURE — 2500000004 HC RX 250 GENERAL PHARMACY W/ HCPCS (ALT 636 FOR OP/ED): Performed by: NURSE ANESTHETIST, CERTIFIED REGISTERED

## 2025-06-25 PROCEDURE — 2500000001 HC RX 250 WO HCPCS SELF ADMINISTERED DRUGS (ALT 637 FOR MEDICARE OP): Performed by: INTERNAL MEDICINE

## 2025-06-25 PROCEDURE — 2500000004 HC RX 250 GENERAL PHARMACY W/ HCPCS (ALT 636 FOR OP/ED): Mod: JW | Performed by: INTERNAL MEDICINE

## 2025-06-25 RX ORDER — HYDROCODONE BITARTRATE AND ACETAMINOPHEN 5; 325 MG/1; MG/1
1 TABLET ORAL EVERY 4 HOURS PRN
Status: DISCONTINUED | OUTPATIENT
Start: 2025-06-25 | End: 2025-06-26 | Stop reason: HOSPADM

## 2025-06-25 RX ORDER — GLYCOPYRROLATE 0.2 MG/ML
INJECTION INTRAMUSCULAR; INTRAVENOUS AS NEEDED
Status: DISCONTINUED | OUTPATIENT
Start: 2025-06-25 | End: 2025-06-25

## 2025-06-25 RX ORDER — SODIUM CHLORIDE, SODIUM LACTATE, POTASSIUM CHLORIDE, CALCIUM CHLORIDE 600; 310; 30; 20 MG/100ML; MG/100ML; MG/100ML; MG/100ML
100 INJECTION, SOLUTION INTRAVENOUS CONTINUOUS
Status: DISCONTINUED | OUTPATIENT
Start: 2025-06-25 | End: 2025-06-26 | Stop reason: HOSPADM

## 2025-06-25 RX ORDER — PANTOPRAZOLE SODIUM 40 MG/1
40 TABLET, DELAYED RELEASE ORAL 2 TIMES DAILY
Qty: 120 TABLET | Refills: 1 | Status: SHIPPED | OUTPATIENT
Start: 2025-06-25 | End: 2025-08-24

## 2025-06-25 RX ORDER — DIPHENHYDRAMINE HYDROCHLORIDE 50 MG/ML
12.5 INJECTION, SOLUTION INTRAMUSCULAR; INTRAVENOUS ONCE AS NEEDED
Status: DISCONTINUED | OUTPATIENT
Start: 2025-06-25 | End: 2025-06-26 | Stop reason: HOSPADM

## 2025-06-25 RX ORDER — HYDRALAZINE HYDROCHLORIDE 20 MG/ML
5 INJECTION INTRAMUSCULAR; INTRAVENOUS EVERY 30 MIN PRN
Status: DISCONTINUED | OUTPATIENT
Start: 2025-06-25 | End: 2025-06-26 | Stop reason: HOSPADM

## 2025-06-25 RX ORDER — MIDAZOLAM HYDROCHLORIDE 1 MG/ML
1 INJECTION, SOLUTION INTRAMUSCULAR; INTRAVENOUS ONCE AS NEEDED
Status: DISCONTINUED | OUTPATIENT
Start: 2025-06-25 | End: 2025-06-26 | Stop reason: HOSPADM

## 2025-06-25 RX ORDER — LABETALOL HYDROCHLORIDE 5 MG/ML
5 INJECTION, SOLUTION INTRAVENOUS
Status: DISCONTINUED | OUTPATIENT
Start: 2025-06-25 | End: 2025-06-26 | Stop reason: HOSPADM

## 2025-06-25 RX ORDER — EPINEPHRINE 0.1 MG/ML
INJECTION INTRACARDIAC; INTRAVENOUS AS NEEDED
Status: COMPLETED | OUTPATIENT
Start: 2025-06-25 | End: 2025-06-25

## 2025-06-25 RX ORDER — SODIUM CHLORIDE, SODIUM LACTATE, POTASSIUM CHLORIDE, CALCIUM CHLORIDE 600; 310; 30; 20 MG/100ML; MG/100ML; MG/100ML; MG/100ML
INJECTION, SOLUTION INTRAVENOUS CONTINUOUS PRN
Status: DISCONTINUED | OUTPATIENT
Start: 2025-06-25 | End: 2025-06-25

## 2025-06-25 RX ORDER — ALBUTEROL SULFATE 0.83 MG/ML
2.5 SOLUTION RESPIRATORY (INHALATION)
Status: DISCONTINUED | OUTPATIENT
Start: 2025-06-25 | End: 2025-06-26 | Stop reason: HOSPADM

## 2025-06-25 RX ORDER — DEXTROMETHORPHAN/PSEUDOEPHED 2.5-7.5/.8
DROPS ORAL AS NEEDED
Status: COMPLETED | OUTPATIENT
Start: 2025-06-25 | End: 2025-06-25

## 2025-06-25 RX ORDER — LIDOCAINE HYDROCHLORIDE 20 MG/ML
INJECTION, SOLUTION INFILTRATION; PERINEURAL AS NEEDED
Status: DISCONTINUED | OUTPATIENT
Start: 2025-06-25 | End: 2025-06-25

## 2025-06-25 RX ORDER — HYDROMORPHONE HYDROCHLORIDE 1 MG/ML
1 INJECTION, SOLUTION INTRAMUSCULAR; INTRAVENOUS; SUBCUTANEOUS EVERY 5 MIN PRN
Status: DISCONTINUED | OUTPATIENT
Start: 2025-06-25 | End: 2025-06-26 | Stop reason: HOSPADM

## 2025-06-25 RX ORDER — PROPOFOL 10 MG/ML
INJECTION, EMULSION INTRAVENOUS CONTINUOUS PRN
Status: DISCONTINUED | OUTPATIENT
Start: 2025-06-25 | End: 2025-06-25

## 2025-06-25 RX ADMIN — PROPOFOL 40 MG: 10 INJECTION, EMULSION INTRAVENOUS at 11:53

## 2025-06-25 RX ADMIN — SODIUM CHLORIDE, POTASSIUM CHLORIDE, SODIUM LACTATE AND CALCIUM CHLORIDE: 600; 310; 30; 20 INJECTION, SOLUTION INTRAVENOUS at 11:39

## 2025-06-25 RX ADMIN — PROPOFOL 150 MCG/KG/MIN: 10 INJECTION, EMULSION INTRAVENOUS at 11:52

## 2025-06-25 RX ADMIN — LIDOCAINE HYDROCHLORIDE 40 MG: 20 INJECTION, SOLUTION INFILTRATION; PERINEURAL at 11:52

## 2025-06-25 RX ADMIN — EPINEPHRINE 0.6 MG: 0.1 INJECTION INTRACARDIAC; INTRAVENOUS at 12:05

## 2025-06-25 RX ADMIN — GLYCOPYRROLATE 0.2 MG: 0.2 INJECTION, SOLUTION INTRAMUSCULAR; INTRAVENOUS at 11:52

## 2025-06-25 RX ADMIN — SIMETHICONE 333 MG: 20 EMULSION ORAL at 11:57

## 2025-06-25 SDOH — HEALTH STABILITY: MENTAL HEALTH: CURRENT SMOKER: 0

## 2025-06-25 ASSESSMENT — PAIN - FUNCTIONAL ASSESSMENT
PAIN_FUNCTIONAL_ASSESSMENT: 0-10
PAIN_FUNCTIONAL_ASSESSMENT: WONG-BAKER FACES
PAIN_FUNCTIONAL_ASSESSMENT: WONG-BAKER FACES

## 2025-06-25 ASSESSMENT — PAIN SCALES - WONG BAKER
WONGBAKER_NUMERICALRESPONSE: NO HURT

## 2025-06-25 ASSESSMENT — PAIN SCALES - GENERAL: PAINLEVEL_OUTOF10: 0 - NO PAIN

## 2025-06-25 NOTE — ANESTHESIA PREPROCEDURE EVALUATION
Patient: Sandra Rousseau    Procedure Information       Date/Time: 06/25/25 1130    Scheduled providers: Rickie Rollins MD    Procedure: EGD    Location: [facility]            Relevant Problems   Neuro   (+) Agoraphobia without panic disorder   (+) Generalized anxiety disorder      Endocrine   (+) Adult hypothyroidism   (+) Morbid (severe) obesity due to excess calories (Multi)      HEENT   (+) Chronic sinusitis, unspecified       Clinical information reviewed:    Allergies                NPO Detail:  No data recorded     Physical Exam    Airway  Mallampati: unable to assess     Cardiovascular Rate: normal     Dental   Comments: Natural dentition     Pulmonary Breath sounds clear to auscultation     Abdominal - normal exam           Anesthesia Plan    History of general anesthesia?: yes  History of complications of general anesthesia?: no    ASA 3     general and MAC   (MAC if tolerated. Will convert to GA if unable to maintain appropriate and acceptable respiratory capacity.)  The patient is not a current smoker.    intravenous induction   Anesthetic plan and risks discussed with patient.    Plan discussed with CRNA.

## 2025-06-25 NOTE — Clinical Note
Patient tolerated the procedure well and is comfortable with no complaints of pain. Vital signs stable. Arousable prior to transport. Patient transported to Phase II via stretcher. Handoff completed. 
Pre - loss of appetite  EGD - biopsy, polypectomy, clip placed, epi injection   Post - r/o h pylori, stomach body polyp, 1 clip in place, 6 ml epi injected 
Nasal mucosa clear.  Mouth with normal mucosa  Throat has no vesicles, no oropharyngeal exudates and uvula is midline.

## 2025-06-25 NOTE — DISCHARGE INSTRUCTIONS

## 2025-06-25 NOTE — ANESTHESIA POSTPROCEDURE EVALUATION
"Patient: Sandra Rousseau    Procedure Summary       Date: 06/25/25 Room / Location: [facility]    Anesthesia Start: 1139 Anesthesia Stop: 1216    Procedure: EGD Diagnosis:       Anorexia      Loss of appetite      Food aversion      Pyloric stenosis ([facility])      Anorexia    Scheduled Providers: Rickie Rollins MD Responsible Provider: Hudson Seay MD    Anesthesia Type: general, MAC ASA Status: 3            Anesthesia Type: general, MAC    BP (!) 154/100   Pulse 85   Temp 37 °C (98.6 °F)   Resp 16   Ht (!) 1.473 m (4' 10\")   Wt 73.5 kg (162 lb)   SpO2 100%   BMI 33.86 kg/m²        Anesthesia Post Evaluation    Patient location during evaluation: PACU  Patient participation: complete - patient participated  Level of consciousness: awake  Pain management: adequate  Airway patency: patent  Cardiovascular status: acceptable  Respiratory status: acceptable  Hydration status: acceptable  Postoperative Nausea and Vomiting: none        There were no known notable events for this encounter.    "

## 2025-07-03 LAB
LAB AP ASR DISCLAIMER: NORMAL
LABORATORY COMMENT REPORT: NORMAL
PATH REPORT.ADDENDUM SPEC: NORMAL
PATH REPORT.FINAL DX SPEC: NORMAL
PATH REPORT.GROSS SPEC: NORMAL
PATH REPORT.RELEVANT HX SPEC: NORMAL
PATH REPORT.TOTAL CANCER: NORMAL

## 2025-07-22 DIAGNOSIS — E03.9 ADULT HYPOTHYROIDISM: Primary | ICD-10-CM

## 2025-07-23 ENCOUNTER — TELEPHONE (OUTPATIENT)
Dept: PRIMARY CARE | Facility: CLINIC | Age: 45
End: 2025-07-23
Payer: MEDICARE

## 2025-07-23 NOTE — TELEPHONE ENCOUNTER
----- Message from Davon Carvajal sent at 7/22/2025  4:52 PM EDT -----  Time for lab ,orders in system  ----- Message -----  From: Davon Carvajal DO  Sent: 7/7/2025  12:00 AM EDT  To: Davon Carvajal DO    Recheck T4 TSH

## 2025-07-29 ENCOUNTER — RESULTS FOLLOW-UP (OUTPATIENT)
Dept: PRIMARY CARE | Facility: CLINIC | Age: 45
End: 2025-07-29
Payer: MEDICARE

## 2025-07-29 LAB
T4 FREE SERPL-MCNC: 1.6 NG/DL (ref 0.8–1.8)
TSH SERPL-ACNC: 0.76 MIU/L

## 2025-07-29 NOTE — TELEPHONE ENCOUNTER
----- Message from Davon Carvajal sent at 7/29/2025  9:09 AM EDT -----  Call Sandra Rousseau Their labs were normal  ----- Message -----  From: [employer] [employer] Results In  Sent: 7/29/2025   8:53 AM EDT  To: Davon Carvajal, DO

## 2025-08-14 ENCOUNTER — APPOINTMENT (OUTPATIENT)
Facility: CLINIC | Age: 45
End: 2025-08-14
Payer: MEDICARE

## 2025-08-14 VITALS
WEIGHT: 167 LBS | BODY MASS INDEX: 34.9 KG/M2 | SYSTOLIC BLOOD PRESSURE: 130 MMHG | DIASTOLIC BLOOD PRESSURE: 82 MMHG | HEART RATE: 92 BPM

## 2025-08-14 DIAGNOSIS — Z00.00 ENCOUNTER FOR GENERAL ADULT MEDICAL EXAMINATION WITHOUT ABNORMAL FINDINGS: ICD-10-CM

## 2025-08-14 PROBLEM — H10.13 ALLERGIC CONJUNCTIVITIS OF BOTH EYES: Status: RESOLVED | Noted: 2023-11-11 | Resolved: 2025-08-14

## 2025-08-14 RX ORDER — OMEPRAZOLE 20 MG/1
20 CAPSULE, DELAYED RELEASE ORAL DAILY
Qty: 90 CAPSULE | Refills: 3 | Status: SHIPPED | OUTPATIENT
Start: 2025-08-14

## 2025-08-23 ASSESSMENT — ENCOUNTER SYMPTOMS
CONSTIPATION: 0
TROUBLE SWALLOWING: 0
COUGH: 0
LIGHT-HEADEDNESS: 0
SEIZURES: 0
SLEEP DISTURBANCE: 1
MYALGIAS: 0
TREMORS: 0
CHOKING: 0
ARTHRALGIAS: 0
ABDOMINAL PAIN: 0
DIZZINESS: 0
DYSURIA: 0

## 2025-08-25 ENCOUNTER — APPOINTMENT (OUTPATIENT)
Dept: BEHAVIORAL HEALTH | Facility: CLINIC | Age: 45
End: 2025-08-25
Payer: MEDICARE

## 2025-08-25 DIAGNOSIS — F41.1 GENERALIZED ANXIETY DISORDER: ICD-10-CM

## 2025-08-25 DIAGNOSIS — F42.2 MIXED OBSESSIONAL THOUGHTS AND ACTS: Primary | ICD-10-CM

## 2025-08-25 DIAGNOSIS — F71 MODERATE INTELLECTUAL DISABILITIES: ICD-10-CM

## 2025-08-25 DIAGNOSIS — F40.02 AGORAPHOBIA WITHOUT PANIC DISORDER: ICD-10-CM

## 2025-08-25 PROCEDURE — 99215 OFFICE O/P EST HI 40 MIN: CPT | Performed by: PSYCHIATRY & NEUROLOGY

## 2025-08-25 PROCEDURE — 1036F TOBACCO NON-USER: CPT | Performed by: PSYCHIATRY & NEUROLOGY

## 2025-08-25 PROCEDURE — G2211 COMPLEX E/M VISIT ADD ON: HCPCS | Performed by: PSYCHIATRY & NEUROLOGY

## 2025-08-25 RX ORDER — ALPRAZOLAM 0.5 MG/1
TABLET ORAL
Qty: 90 TABLET | Refills: 2 | Status: SHIPPED | OUTPATIENT
Start: 2025-08-25

## 2025-08-25 ASSESSMENT — ENCOUNTER SYMPTOMS
VOMITING: 0
ACTIVITY CHANGE: 1
APPETITE CHANGE: 1

## 2025-11-17 ENCOUNTER — APPOINTMENT (OUTPATIENT)
Dept: BEHAVIORAL HEALTH | Facility: CLINIC | Age: 45
End: 2025-11-17
Payer: MEDICARE

## 2026-02-05 ENCOUNTER — APPOINTMENT (OUTPATIENT)
Dept: PRIMARY CARE | Facility: CLINIC | Age: 46
End: 2026-02-05
Payer: MEDICARE